# Patient Record
Sex: FEMALE | Race: WHITE | Employment: UNEMPLOYED | URBAN - NONMETROPOLITAN AREA
[De-identification: names, ages, dates, MRNs, and addresses within clinical notes are randomized per-mention and may not be internally consistent; named-entity substitution may affect disease eponyms.]

---

## 2020-05-11 LAB
ABO, EXTERNAL RESULT: NORMAL
C. TRACHOMATIS, EXTERNAL RESULT: NEGATIVE
HEP B, EXTERNAL RESULT: NEGATIVE
HIV, EXTERNAL RESULT: NORMAL
N. GONORRHOEAE, EXTERNAL RESULT: NEGATIVE
RH FACTOR, EXTERNAL RESULT: POSITIVE
RPR, EXTERNAL RESULT: NORMAL
RUBELLA TITER, EXTERNAL RESULT: NORMAL

## 2020-06-23 ENCOUNTER — HOSPITAL ENCOUNTER (OUTPATIENT)
Dept: ULTRASOUND IMAGING | Age: 25
Discharge: HOME | End: 2020-06-25
Payer: COMMERCIAL

## 2020-06-23 PROCEDURE — 76811 OB US DETAILED SNGL FETUS: CPT

## 2020-10-16 LAB — GBS, EXTERNAL RESULT: NEGATIVE

## 2020-10-23 ENCOUNTER — HOSPITAL ENCOUNTER (OUTPATIENT)
Age: 25
Discharge: HOME | End: 2020-10-23
Attending: OBSTETRICS & GYNECOLOGY | Admitting: OBSTETRICS & GYNECOLOGY
Payer: COMMERCIAL

## 2020-10-23 ENCOUNTER — HOSPITAL ENCOUNTER (OUTPATIENT)
Dept: ULTRASOUND IMAGING | Age: 25
Discharge: HOME | End: 2020-10-25
Payer: COMMERCIAL

## 2020-10-23 VITALS
SYSTOLIC BLOOD PRESSURE: 100 MMHG | HEART RATE: 78 BPM | DIASTOLIC BLOOD PRESSURE: 66 MMHG | TEMPERATURE: 98.3 F | RESPIRATION RATE: 16 BRPM

## 2020-10-23 PROCEDURE — 76819 FETAL BIOPHYS PROFIL W/O NST: CPT

## 2020-10-23 PROCEDURE — 59025 FETAL NON-STRESS TEST: CPT

## 2020-10-23 NOTE — FLOWSHEET NOTE
Patient off unit in stable condition.     Departure Mode: SELF    Mobility at Departure: ambulatory    Discharged to: private residence    Time of Discharge: [phone]

## 2020-10-23 NOTE — FLOWSHEET NOTE
D/C INSTRUCTIONS REVIEWED WITH PT.  PT ACKNOWLEDGES AND UNDERSTANDS. DENIES FURTHER NEEDS AT THIS TIME.

## 2020-10-23 NOTE — FLOWSHEET NOTE
Patient presents to L&D today for BORDERLINE OLIGOHYDRAMNIOS    IUP at 38 1/7 weeks     NST is reactive. Quality of tracing is satisfactory.

## 2020-10-24 ENCOUNTER — HOSPITAL ENCOUNTER (OUTPATIENT)
Age: 25
Discharge: HOME | End: 2020-10-24
Attending: OBSTETRICS & GYNECOLOGY | Admitting: OBSTETRICS & GYNECOLOGY
Payer: COMMERCIAL

## 2020-10-24 ENCOUNTER — APPOINTMENT (OUTPATIENT)
Dept: ULTRASOUND IMAGING | Age: 25
End: 2020-10-24
Payer: COMMERCIAL

## 2020-10-24 VITALS
SYSTOLIC BLOOD PRESSURE: 124 MMHG | HEART RATE: 101 BPM | RESPIRATION RATE: 18 BRPM | TEMPERATURE: 98.1 F | DIASTOLIC BLOOD PRESSURE: 84 MMHG

## 2020-10-24 PROCEDURE — 59025 FETAL NON-STRESS TEST: CPT

## 2020-10-24 PROCEDURE — 76818 FETAL BIOPHYS PROFILE W/NST: CPT

## 2020-10-24 NOTE — FLOWSHEET NOTE
Dr Jeff Norman called et notified of reactive NST and 8/8 on BPP. States pt can go home, pt to follow up on Monday in the office.

## 2020-10-27 ENCOUNTER — HOSPITAL ENCOUNTER (OUTPATIENT)
Age: 25
Discharge: HOME | End: 2020-10-27
Attending: OBSTETRICS & GYNECOLOGY | Admitting: OBSTETRICS & GYNECOLOGY
Payer: COMMERCIAL

## 2020-10-27 VITALS
HEIGHT: 63 IN | HEART RATE: 77 BPM | DIASTOLIC BLOOD PRESSURE: 70 MMHG | SYSTOLIC BLOOD PRESSURE: 116 MMHG | TEMPERATURE: 98 F | WEIGHT: 163 LBS | RESPIRATION RATE: 18 BRPM | BODY MASS INDEX: 28.88 KG/M2

## 2020-10-27 VITALS
HEART RATE: 83 BPM | SYSTOLIC BLOOD PRESSURE: 131 MMHG | RESPIRATION RATE: 18 BRPM | DIASTOLIC BLOOD PRESSURE: 74 MMHG | TEMPERATURE: 98.3 F

## 2020-10-27 LAB
-: NORMAL
AMORPHOUS: NORMAL
BACTERIA: NORMAL
BILIRUBIN URINE: NEGATIVE
CASTS UA: NORMAL /LPF
COLOR: YELLOW
COMMENT UA: ABNORMAL
CRYSTALS, UA: NORMAL /HPF
EPITHELIAL CELLS UA: NORMAL /HPF (ref 0–25)
GLUCOSE URINE: NEGATIVE
KETONES, URINE: NEGATIVE
LEUKOCYTE ESTERASE, URINE: NEGATIVE
MUCUS: NORMAL
NITRITE, URINE: NEGATIVE
OTHER OBSERVATIONS UA: NORMAL
PH UA: 8.5 (ref 5–9)
PROTEIN UA: NEGATIVE
RBC UA: NORMAL /HPF (ref 0–2)
RENAL EPITHELIAL, UA: NORMAL /HPF
SPECIFIC GRAVITY UA: 1.02 (ref 1.01–1.02)
TRICHOMONAS: NORMAL
TURBIDITY: CLEAR
URINE HGB: ABNORMAL
UROBILINOGEN, URINE: NORMAL
WBC UA: NORMAL /HPF (ref 0–5)
YEAST: NORMAL

## 2020-10-27 PROCEDURE — 99214 OFFICE O/P EST MOD 30 MIN: CPT

## 2020-10-27 PROCEDURE — 81001 URINALYSIS AUTO W/SCOPE: CPT

## 2020-10-27 PROCEDURE — 59025 FETAL NON-STRESS TEST: CPT

## 2020-10-27 RX ORDER — CALCIUM CARBONATE 200(500)MG
2 TABLET,CHEWABLE ORAL DAILY PRN
Status: ON HOLD | COMMUNITY
End: 2020-10-29 | Stop reason: HOSPADM

## 2020-10-27 RX ORDER — VITAMIN A, VITAMIN C, VITAMIN D-3, VITAMIN E, VITAMIN B-1, VITAMIN B-2, NIACIN, VITAMIN B-6, CALCIUM, IRON, ZINC, COPPER 4000; 120; 400; 22; 1.84; 3; 20; 10; 1; 12; 200; 27; 25; 2 [IU]/1; MG/1; [IU]/1; MG/1; MG/1; MG/1; MG/1; MG/1; MG/1; UG/1; MG/1; MG/1; MG/1; MG/1
2 TABLET ORAL DAILY
Status: ON HOLD | COMMUNITY
End: 2020-10-29 | Stop reason: HOSPADM

## 2020-10-27 NOTE — PROGRESS NOTES
Patient presents to L&D today for oligohydramnios. IUP at 38 5/7 weeks     NST is reactive. Quality of tracing is satisfactory.

## 2020-10-27 NOTE — PROGRESS NOTES
Pt here for scheduled nonstress test. Pt to come back on Friday for BPP with NST. Pt states she has \"borderline low amniotic fluid\". Pt states she has had a pinkish discharge today, saw Dr. Nuno Player yesterday and had SVE. Pt states she has been having contractions since 06:30 this morning but they started to spread further apart prior to arrival, states not close enough together to time once she thought about trying to time them. Pt reports + fetal movement today. Pt denies headache, blurred vision or floaters. Pt denies epigastric pain. Pt denies pain or burning when urinating.

## 2020-10-28 ENCOUNTER — ANESTHESIA (OUTPATIENT)
Dept: LABOR AND DELIVERY | Age: 25
End: 2020-10-28
Payer: COMMERCIAL

## 2020-10-28 ENCOUNTER — HOSPITAL ENCOUNTER (INPATIENT)
Age: 25
LOS: 1 days | Discharge: HOME | End: 2020-10-29
Attending: OBSTETRICS & GYNECOLOGY | Admitting: OBSTETRICS & GYNECOLOGY
Payer: COMMERCIAL

## 2020-10-28 ENCOUNTER — ANESTHESIA EVENT (OUTPATIENT)
Dept: LABOR AND DELIVERY | Age: 25
End: 2020-10-28
Payer: COMMERCIAL

## 2020-10-28 PROBLEM — Z34.90 TERM PREGNANCY: Status: ACTIVE | Noted: 2020-10-28

## 2020-10-28 LAB
ABSOLUTE EOS #: 0.2 K/UL (ref 0–0.44)
ABSOLUTE IMMATURE GRANULOCYTE: 0.07 K/UL (ref 0–0.3)
ABSOLUTE LYMPH #: 2.1 K/UL (ref 1.1–3.7)
ABSOLUTE MONO #: 0.84 K/UL (ref 0.1–1.2)
AMPHETAMINE SCREEN URINE: NEGATIVE
BARBITURATE SCREEN URINE: NEGATIVE
BASOPHILS # BLD: 0 % (ref 0–2)
BASOPHILS ABSOLUTE: 0.05 K/UL (ref 0–0.2)
BENZODIAZEPINE SCREEN, URINE: NEGATIVE
BUPRENORPHINE URINE: NEGATIVE
CANNABINOID SCREEN URINE: NEGATIVE
COCAINE METABOLITE, URINE: NEGATIVE
DIFFERENTIAL TYPE: ABNORMAL
EOSINOPHILS RELATIVE PERCENT: 1 % (ref 1–4)
HCT VFR BLD CALC: 35.5 % (ref 36.3–47.1)
HEMOGLOBIN: 11.1 G/DL (ref 11.9–15.1)
IMMATURE GRANULOCYTES: 0 %
LYMPHOCYTES # BLD: 13 % (ref 24–43)
MCH RBC QN AUTO: 27.9 PG (ref 25.2–33.5)
MCHC RBC AUTO-ENTMCNC: 31.3 G/DL (ref 28.4–34.8)
MCV RBC AUTO: 89.2 FL (ref 82.6–102.9)
MDMA URINE: NORMAL
METHADONE SCREEN, URINE: NEGATIVE
METHAMPHETAMINE, URINE: NEGATIVE
MONOCYTES # BLD: 5 % (ref 3–12)
NRBC AUTOMATED: 0 PER 100 WBC
OPIATES, URINE: NEGATIVE
OXYCODONE SCREEN URINE: NEGATIVE
PDW BLD-RTO: 13.2 % (ref 11.8–14.4)
PHENCYCLIDINE, URINE: NEGATIVE
PLATELET # BLD: 230 K/UL (ref 138–453)
PLATELET ESTIMATE: ABNORMAL
PMV BLD AUTO: 10 FL (ref 8.1–13.5)
PROPOXYPHENE, URINE: NEGATIVE
RBC # BLD: 3.98 M/UL (ref 3.95–5.11)
RBC # BLD: ABNORMAL 10*6/UL
SEG NEUTROPHILS: 81 % (ref 36–65)
SEGMENTED NEUTROPHILS ABSOLUTE COUNT: 12.63 K/UL (ref 1.5–8.1)
TEST INFORMATION: NORMAL
TRICYCLIC ANTIDEPRESSANTS, UR: NEGATIVE
WBC # BLD: 15.9 K/UL (ref 3.5–11.3)
WBC # BLD: ABNORMAL 10*3/UL

## 2020-10-28 PROCEDURE — 51701 INSERT BLADDER CATHETER: CPT

## 2020-10-28 PROCEDURE — 51702 INSERT TEMP BLADDER CATH: CPT

## 2020-10-28 PROCEDURE — 7200000001 HC VAGINAL DELIVERY

## 2020-10-28 PROCEDURE — 6360000002 HC RX W HCPCS: Performed by: OBSTETRICS & GYNECOLOGY

## 2020-10-28 PROCEDURE — 0DQP0ZZ REPAIR RECTUM, OPEN APPROACH: ICD-10-PCS | Performed by: OBSTETRICS & GYNECOLOGY

## 2020-10-28 PROCEDURE — 2580000003 HC RX 258: Performed by: OBSTETRICS & GYNECOLOGY

## 2020-10-28 PROCEDURE — 3E033VJ INTRODUCTION OF OTHER HORMONE INTO PERIPHERAL VEIN, PERCUTANEOUS APPROACH: ICD-10-PCS | Performed by: OBSTETRICS & GYNECOLOGY

## 2020-10-28 PROCEDURE — 88307 TISSUE EXAM BY PATHOLOGIST: CPT

## 2020-10-28 PROCEDURE — 3700000025 EPIDURAL BLOCK: Performed by: NURSE ANESTHETIST, CERTIFIED REGISTERED

## 2020-10-28 PROCEDURE — 2500000003 HC RX 250 WO HCPCS: Performed by: NURSE ANESTHETIST, CERTIFIED REGISTERED

## 2020-10-28 PROCEDURE — 85025 COMPLETE CBC W/AUTO DIFF WBC: CPT

## 2020-10-28 PROCEDURE — 1220000000 HC SEMI PRIVATE OB R&B

## 2020-10-28 PROCEDURE — 6360000002 HC RX W HCPCS: Performed by: NURSE ANESTHETIST, CERTIFIED REGISTERED

## 2020-10-28 PROCEDURE — 80306 DRUG TEST PRSMV INSTRMNT: CPT

## 2020-10-28 PROCEDURE — 6370000000 HC RX 637 (ALT 250 FOR IP): Performed by: OBSTETRICS & GYNECOLOGY

## 2020-10-28 RX ORDER — ROPIVACAINE HYDROCHLORIDE 2 MG/ML
INJECTION, SOLUTION EPIDURAL; INFILTRATION; PERINEURAL CONTINUOUS PRN
Status: DISCONTINUED | OUTPATIENT
Start: 2020-10-28 | End: 2020-10-28 | Stop reason: SDUPTHER

## 2020-10-28 RX ORDER — METHYLERGONOVINE MALEATE 0.2 MG/ML
200 INJECTION INTRAVENOUS PRN
Status: DISCONTINUED | OUTPATIENT
Start: 2020-10-28 | End: 2020-10-28

## 2020-10-28 RX ORDER — ACETAMINOPHEN 325 MG/1
650 TABLET ORAL EVERY 4 HOURS PRN
Status: DISCONTINUED | OUTPATIENT
Start: 2020-10-28 | End: 2020-10-29 | Stop reason: HOSPADM

## 2020-10-28 RX ORDER — SEVOFLURANE 250 ML/250ML
1 LIQUID RESPIRATORY (INHALATION) CONTINUOUS PRN
Status: DISCONTINUED | OUTPATIENT
Start: 2020-10-28 | End: 2020-10-28

## 2020-10-28 RX ORDER — MISOPROSTOL 100 UG/1
900 TABLET ORAL PRN
Status: DISCONTINUED | OUTPATIENT
Start: 2020-10-28 | End: 2020-10-28

## 2020-10-28 RX ORDER — CALCIUM CARBONATE 200(500)MG
2 TABLET,CHEWABLE ORAL DAILY PRN
Status: DISCONTINUED | OUTPATIENT
Start: 2020-10-28 | End: 2020-10-29 | Stop reason: HOSPADM

## 2020-10-28 RX ORDER — NALOXONE HYDROCHLORIDE 0.4 MG/ML
0.4 INJECTION, SOLUTION INTRAMUSCULAR; INTRAVENOUS; SUBCUTANEOUS PRN
Status: DISCONTINUED | OUTPATIENT
Start: 2020-10-28 | End: 2020-10-28

## 2020-10-28 RX ORDER — SODIUM CHLORIDE, SODIUM LACTATE, POTASSIUM CHLORIDE, CALCIUM CHLORIDE 600; 310; 30; 20 MG/100ML; MG/100ML; MG/100ML; MG/100ML
INJECTION, SOLUTION INTRAVENOUS PRN
Status: DISCONTINUED | OUTPATIENT
Start: 2020-10-28 | End: 2020-10-28

## 2020-10-28 RX ORDER — ONDANSETRON 2 MG/ML
4 INJECTION INTRAMUSCULAR; INTRAVENOUS EVERY 6 HOURS PRN
Status: DISCONTINUED | OUTPATIENT
Start: 2020-10-28 | End: 2020-10-29 | Stop reason: HOSPADM

## 2020-10-28 RX ORDER — SODIUM CHLORIDE 0.9 % (FLUSH) 0.9 %
10 SYRINGE (ML) INJECTION PRN
Status: DISCONTINUED | OUTPATIENT
Start: 2020-10-28 | End: 2020-10-28

## 2020-10-28 RX ORDER — ACETAMINOPHEN 325 MG/1
650 TABLET ORAL EVERY 4 HOURS PRN
Status: DISCONTINUED | OUTPATIENT
Start: 2020-10-28 | End: 2020-10-28

## 2020-10-28 RX ORDER — SODIUM CHLORIDE, SODIUM LACTATE, POTASSIUM CHLORIDE, CALCIUM CHLORIDE 600; 310; 30; 20 MG/100ML; MG/100ML; MG/100ML; MG/100ML
INJECTION, SOLUTION INTRAVENOUS CONTINUOUS
Status: DISCONTINUED | OUTPATIENT
Start: 2020-10-28 | End: 2020-10-29 | Stop reason: HOSPADM

## 2020-10-28 RX ORDER — IBUPROFEN 800 MG/1
800 TABLET ORAL EVERY 8 HOURS SCHEDULED
Status: DISCONTINUED | OUTPATIENT
Start: 2020-10-28 | End: 2020-10-29 | Stop reason: HOSPADM

## 2020-10-28 RX ORDER — PRENATAL WITH FERROUS FUM AND FOLIC ACID 3080; 920; 120; 400; 22; 1.84; 3; 20; 10; 1; 12; 200; 27; 25; 2 [IU]/1; [IU]/1; MG/1; [IU]/1; MG/1; MG/1; MG/1; MG/1; MG/1; MG/1; UG/1; MG/1; MG/1; MG/1; MG/1
1 TABLET ORAL DAILY
Status: DISCONTINUED | OUTPATIENT
Start: 2020-10-28 | End: 2020-10-29 | Stop reason: HOSPADM

## 2020-10-28 RX ORDER — DOCUSATE SODIUM 100 MG/1
100 CAPSULE, LIQUID FILLED ORAL 2 TIMES DAILY
Status: DISCONTINUED | OUTPATIENT
Start: 2020-10-28 | End: 2020-10-29 | Stop reason: HOSPADM

## 2020-10-28 RX ORDER — ONDANSETRON 2 MG/ML
4 INJECTION INTRAMUSCULAR; INTRAVENOUS EVERY 6 HOURS PRN
Status: DISCONTINUED | OUTPATIENT
Start: 2020-10-28 | End: 2020-10-28

## 2020-10-28 RX ORDER — EPHEDRINE SULFATE/0.9% NACL/PF 50 MG/5 ML
5 SYRINGE (ML) INTRAVENOUS EVERY 5 MIN PRN
Status: DISCONTINUED | OUTPATIENT
Start: 2020-10-28 | End: 2020-10-28

## 2020-10-28 RX ORDER — METHYLERGONOVINE MALEATE 0.2 MG/ML
200 INJECTION INTRAVENOUS PRN
Status: DISCONTINUED | OUTPATIENT
Start: 2020-10-28 | End: 2020-10-29 | Stop reason: HOSPADM

## 2020-10-28 RX ORDER — LIDOCAINE HYDROCHLORIDE 10 MG/ML
30 INJECTION, SOLUTION EPIDURAL; INFILTRATION; INTRACAUDAL; PERINEURAL PRN
Status: DISCONTINUED | OUTPATIENT
Start: 2020-10-28 | End: 2020-10-28

## 2020-10-28 RX ORDER — SODIUM CHLORIDE 0.9 % (FLUSH) 0.9 %
10 SYRINGE (ML) INJECTION EVERY 12 HOURS SCHEDULED
Status: DISCONTINUED | OUTPATIENT
Start: 2020-10-28 | End: 2020-10-28

## 2020-10-28 RX ORDER — LIDOCAINE HYDROCHLORIDE 20 MG/ML
INJECTION, SOLUTION EPIDURAL; INFILTRATION; INTRACAUDAL; PERINEURAL PRN
Status: DISCONTINUED | OUTPATIENT
Start: 2020-10-28 | End: 2020-10-28 | Stop reason: SDUPTHER

## 2020-10-28 RX ORDER — ROPIVACAINE HYDROCHLORIDE 2 MG/ML
10 INJECTION, SOLUTION EPIDURAL; INFILTRATION; PERINEURAL CONTINUOUS
Status: DISCONTINUED | OUTPATIENT
Start: 2020-10-28 | End: 2020-10-28

## 2020-10-28 RX ORDER — CARBOPROST TROMETHAMINE 250 UG/ML
250 INJECTION, SOLUTION INTRAMUSCULAR PRN
Status: DISCONTINUED | OUTPATIENT
Start: 2020-10-28 | End: 2020-10-28

## 2020-10-28 RX ORDER — FENTANYL CITRATE 50 UG/ML
25 INJECTION, SOLUTION INTRAMUSCULAR; INTRAVENOUS
Status: DISCONTINUED | OUTPATIENT
Start: 2020-10-28 | End: 2020-10-28

## 2020-10-28 RX ADMIN — DOCUSATE SODIUM 100 MG: 100 CAPSULE ORAL at 20:00

## 2020-10-28 RX ADMIN — IBUPROFEN 800 MG: 800 TABLET, FILM COATED ORAL at 14:33

## 2020-10-28 RX ADMIN — ROPIVACAINE HYDROCHLORIDE 10 ML/HR: 2 INJECTION, SOLUTION EPIDURAL; INFILTRATION at 07:33

## 2020-10-28 RX ADMIN — LIDOCAINE HYDROCHLORIDE 2 ML: 20 INJECTION, SOLUTION EPIDURAL; INFILTRATION; INTRACAUDAL; PERINEURAL at 07:26

## 2020-10-28 RX ADMIN — OXYTOCIN 1 MILLI-UNITS/MIN: 10 INJECTION INTRAVENOUS at 08:01

## 2020-10-28 RX ADMIN — OXYTOCIN 1 MILLI-UNITS/MIN: 10 INJECTION INTRAVENOUS at 10:33

## 2020-10-28 RX ADMIN — LIDOCAINE HYDROCHLORIDE 3 ML: 20 INJECTION, SOLUTION EPIDURAL; INFILTRATION; INTRACAUDAL; PERINEURAL at 07:24

## 2020-10-28 RX ADMIN — Medication 40 EACH: at 14:33

## 2020-10-28 RX ADMIN — DOCUSATE SODIUM 100 MG: 100 CAPSULE ORAL at 14:33

## 2020-10-28 RX ADMIN — SODIUM CHLORIDE, POTASSIUM CHLORIDE, SODIUM LACTATE AND CALCIUM CHLORIDE: 600; 310; 30; 20 INJECTION, SOLUTION INTRAVENOUS at 11:25

## 2020-10-28 RX ADMIN — BENZOCAINE AND LEVOMENTHOL: 200; 5 SPRAY TOPICAL at 14:34

## 2020-10-28 RX ADMIN — ACETAMINOPHEN 650 MG: 325 TABLET, FILM COATED ORAL at 20:00

## 2020-10-28 RX ADMIN — BUTORPHANOL TARTRATE 1 MG: 2 INJECTION, SOLUTION INTRAMUSCULAR; INTRAVENOUS at 04:55

## 2020-10-28 RX ADMIN — SODIUM CHLORIDE, POTASSIUM CHLORIDE, SODIUM LACTATE AND CALCIUM CHLORIDE: 600; 310; 30; 20 INJECTION, SOLUTION INTRAVENOUS at 07:26

## 2020-10-28 RX ADMIN — IBUPROFEN 800 MG: 800 TABLET, FILM COATED ORAL at 22:02

## 2020-10-28 RX ADMIN — Medication 166.7 ML: at 12:20

## 2020-10-28 RX ADMIN — SODIUM CHLORIDE, POTASSIUM CHLORIDE, SODIUM LACTATE AND CALCIUM CHLORIDE: 600; 310; 30; 20 INJECTION, SOLUTION INTRAVENOUS at 04:55

## 2020-10-28 ASSESSMENT — PAIN SCALES - GENERAL
PAINLEVEL_OUTOF10: 2
PAINLEVEL_OUTOF10: 5
PAINLEVEL_OUTOF10: 2
PAINLEVEL_OUTOF10: 10

## 2020-10-28 ASSESSMENT — PAIN DESCRIPTION - DESCRIPTORS
DESCRIPTORS: CRAMPING
DESCRIPTORS: DISCOMFORT

## 2020-10-28 NOTE — PROGRESS NOTES
After discussing plan of care with patient she states she would like to go to her mom's house to sleep (in [place]). States she will come back to unit if contractions intensify or get closer together. Dr. Randa Waldrop states to discharge her home and that she will follow-up in his office tomorrow morning (10/28).

## 2020-10-28 NOTE — ANESTHESIA PROCEDURE NOTES
Epidural Block    Patient location during procedure: OB  Start time: 10/28/2020 7:14 AM  End time: 10/28/2020 7:34 AM  Reason for block: labor epidural  Staffing  Resident/CRNA: ABRAHAM Giordano - CRNA  Preanesthetic Checklist  Completed: patient identified, site marked, surgical consent, pre-op evaluation, timeout performed, IV checked, risks and benefits discussed, monitors and equipment checked, anesthesia consent given, oxygen available and patient being monitored  Epidural  Patient position: sitting  Prep: ChloraPrep and site prepped and draped  Patient monitoring: continuous pulse ox and frequent blood pressure checks  Approach: midline  Location: lumbar (1-5)  Injection technique: FLORIDALMA air  Provider prep: sterile gloves and mask  Needle  Needle type: Tuohy   Needle gauge: 17 G  Needle length: 3.5 in  Needle insertion depth: 6 cm  Catheter type: side hole  Catheter size: 19 G  Catheter at skin depth: 11 cm  Test dose: negative (5 ml lidocaine 1.5% +epi 1:200K)  Kit: [employer] Perifix FX  Lot number: [id number]  Expiration date: 6/1/2021  Assessment  Sensory level: T8  Hemodynamics: stable  Attempts: 2  Additional Notes  0714 positoned for epidural, patient very uncomfortable with frequent ctx, unsuccessful attempt at L4-5, patient moving  0718 repositoned to modified yoga pose with legs crisscrossed, sterile field maintained but epidrual catheter fell to floor, new kit opened  0722 epidural placed easily x 1 attempt in new position  0723 test dose then taped in place and incrementally bolused  0727 to supine with right uterine displacement  0734 epidural infusion initiated and instructions given to patient regarding use of PCEA, patient expresses undrestanding.   Patient is comfortable and VSS

## 2020-10-28 NOTE — FLOWSHEET NOTE
Pt. Placed supine for cason placement, decel in to the 90s for 2 min noted, placed on left side after cason placed and decel resolved.

## 2020-10-28 NOTE — PROGRESS NOTES
Dr Jeff Norman updated via telephone on SVE, contractions, and pt undecided on epidural. No orders received at this time.

## 2020-10-28 NOTE — PROGRESS NOTES
Patient arrives to unit with complaint of contractions. Pt 38w5d. Up to bathroom to change in to gown and provide urine specimen.

## 2020-10-28 NOTE — PROGRESS NOTES
Patient presents to L&D today for rule out uterine contractions    IUP at 38w5d. NST is reactive. Quality of tracing is satisfactory.

## 2020-10-28 NOTE — L&D DELIVERY SUMMARY NOTE
Department of Obstetrics and Gynecology  Spontaneous Vaginal Delivery Note         Pre-operative Diagnosis:  Term pregnancy    Post-operative Diagnosis:  Same, delivered, Term single living child    Procedure:  Spontaneous vaginal delivery outlet vacuum forceps    Surgeon:  Nba Rowan M.D. Information for the patient's : Matteo Ellis [[id number]]          Anesthesia:  epidural anesthesia    Estimated blood loss:  300ml    Specimen:  Placenta was sent for pathological analysis. Umbilical cord blood sent Yes    Umbilical cord blood gases: not required    Complications:  4th degree perineal laceration    Condition:  infant stable to general nursery    Details of Procedure: The patient is a 26 y.o. female at 38w7d   OB History        2    Para        Term                AB   1    Living           SAB   1    TAB        Ectopic        Molar        Multiple        Live Births                 who was admitted for active phase labor. She received the following interventions: IV Pitocin induction The patient progressed well,did receive an epidural, became complete and started to push. After pushing for 1.5 hours the fetal head was at the perineum but due maternal exhaustion the pushing efforts were almost absent. A median episiotomy was performed. An outlet vacuum Kiwi forceps were gently placed and with contractions gentle traction was applied. After three attempts the head was delivered OP presentation. Nose and mouth were suctioned and the rest of the baby's body was delivered and placed on the mother's abdomen. Good crying effort was immediately obtained. The placenta was delivered spontaneously without any complications. A 4th degree perineal laceration was identified. The 4th degree perineal laceration was carefully as per the most recent edition of the [employer] book.  Chromic 3-0 was used for the rectal mucosa, chromic one for the sphincter anus and 2-0 chromic for the vaginal mucosa and skin. I was quite satisfied with the outcome of the repair. No other lacerations identified. Patient aware about the laceration and the need to use stool softeners for the next 6-8 weeks.

## 2020-10-28 NOTE — ANESTHESIA PRE PROCEDURE
Department of Anesthesiology  Preprocedure Note       Name:  Ling Monteiro   Age:  26 y. o.  :  1995                                          MRN:  [id number]         Date:  10/28/2020      Surgeon: * No surgeons listed *    Procedure: * No procedures listed *    Medications prior to admission:   Prior to Admission medications    Medication Sig Start Date End Date Taking?  Authorizing Provider   Prenatal Vit-Fe Fumarate-FA (PRENATAL VITAMIN PLUS LOW IRON) 27-1 MG TABS Take 2 tablets by mouth daily    Historical Provider, MD   calcium carbonate (TUMS) 500 MG chewable tablet Take 2 tablets by mouth daily as needed for Heartburn    Historical Provider, MD       Current medications:    Current Facility-Administered Medications   Medication Dose Route Frequency Provider Last Rate Last Dose    lactated ringers infusion   Intravenous PRN Brad Begum  mL/hr at 10/28/20 0648      sodium chloride flush 0.9 % injection 10 mL  10 mL Intravenous 2 times per day Brad Begum MD        sodium chloride flush 0.9 % injection 10 mL  10 mL Intravenous PRN Brad Begum MD        lidocaine PF 1 % injection 30 mL  30 mL Other PRN Brad Begum MD        nitrous oxide 50% inhalation 1 each  1 each Inhalation Continuous PRN Brad Begum MD        acetaminophen (TYLENOL) tablet 650 mg  650 mg Oral Q4H PRN Brad Begum MD        ondansetron (ZOFRAN) injection 4 mg  4 mg Intravenous Q6H PRN Brad Begum MD        oxytocin (PITOCIN) 10 unit bolus from the bag  10 Units Intravenous PRN Brad Begum MD        And    oxytocin (PITOCIN) 30 Units in sodium chloride 0.9 % 500 mL infusion  95 earle-units/min Intravenous PRN Brad Begum MD        methylergonovine (METHERGINE) injection 200 mcg  200 mcg Intramuscular PRN Brad Begum MD        carboprost (HEMABATE) injection 250 mcg  250 mcg Intramuscular PRN Brad Begum MD        miSOPROStol (CYTOTEC) tablet 900 mcg  900 mcg Rectal PRN Brad Begum MD  witch hazel-glycerin (TUCKS) pad   Topical PRN Janice Murdock MD        benzocaine-menthol (DERMOPLAST) 20-0.5 % spray   Topical PRN Janice Murdock MD        butorphanol (STADOL) injection 1 mg  1 mg Intravenous Q3H PRN Janice Murdock MD   1 mg at 10/28/20 0455       Allergies:     Allergies   Allergen Reactions    Rabbit Epithelium Hives       Problem List:    Patient Active Problem List   Diagnosis Code    Term pregnancy Z34.90       Past Medical History:        Diagnosis Date    Anemia     Seizures ([address]     febrile seizures when 2 yr old       Past Surgical History:        Procedure Laterality Date    TONSILLECTOMY         Social History:    Social History     Tobacco Use    Smoking status: Never Smoker    Smokeless tobacco: Never Used   Substance Use Topics    Alcohol use: Not Currently                                Counseling given: Not Answered      Vital Signs (Current):   Vitals:    10/28/20 0219 10/28/20 0455 10/28/20 0503 10/28/20 0614   BP: 122/76  119/80    Pulse: 71  80    Resp:   20 18   Temp:  36.4 °C (97.5 °F)     TempSrc:  Axillary                                                BP Readings from Last 3 Encounters:   10/28/20 119/80   10/27/20 131/74   10/27/20 116/70       NPO Status:                                                                                 BMI:   Wt Readings from Last 3 Encounters:   10/27/20 163 lb (73.9 kg)     There is no height or weight on file to calculate BMI.    CBC:   Lab Results   Component Value Date    WBC 15.9 10/28/2020    RBC 3.98 10/28/2020    RBC 4.45 05/21/2012    HGB 11.1 10/28/2020    HCT 35.5 10/28/2020    MCV 89.2 10/28/2020    RDW 13.2 10/28/2020     10/28/2020     05/21/2012       CMP:   Lab Results   Component Value Date     05/21/2012    K 3.9 05/21/2012     05/21/2012    CO2 30 05/21/2012    BUN 17 05/21/2012    CREATININE 0.83 05/21/2012    GFRAA NOT REPORTED 05/21/2012    LABGLOM  05/21/2012     Pediatric GFR requires additional information. Refer to [facility] website for    GLUCOSE 94 05/21/2012    CALCIUM 9.6 05/21/2012    BILITOT 0.30 05/21/2012    ALKPHOS 82 05/21/2012    AST 20 05/21/2012    ALT 14 05/21/2012       POC Tests: No results for input(s): POCGLU, POCNA, POCK, POCCL, POCBUN, POCHEMO, POCHCT in the last 72 hours. Coags: No results found for: PROTIME, INR, APTT    HCG (If Applicable): No results found for: PREGTESTUR, PREGSERUM, HCG, HCGQUANT     ABGs: No results found for: PHART, PO2ART, CNW7DHJ, PHR5XTI, BEART, J9TBEXPA     Type & Screen (If Applicable):  No results found for: LABABO, LABRH    Drug/Infectious Status (If Applicable):  No results found for: HIV, HEPCAB    COVID-19 Screening (If Applicable): No results found for: COVID19      Anesthesia Evaluation  Patient summary reviewed and Nursing notes reviewed no history of anesthetic complications:   Airway: Mallampati: I  TM distance: >3 FB     Mouth opening: > = 3 FB Dental: normal exam         Pulmonary:Negative Pulmonary ROS and normal exam                               Cardiovascular:Negative CV ROS                      Neuro/Psych:   Negative Neuro/Psych ROS              GI/Hepatic/Renal: Neg GI/Hepatic/Renal ROS            Endo/Other: Negative Endo/Other ROS                    Abdominal:           Vascular: negative vascular ROS. Anesthesia Plan      epidural     ASA 2             Anesthetic plan and risks discussed with patient.                     Barry Woodruff, ABRAHAM - CRNA   10/28/2020

## 2020-10-28 NOTE — FLOWSHEET NOTE
Called CRNA for patient request of epidural. CRNA is calling say shift CRNA to see if they can come to unit.  CRNA will call RN back

## 2020-10-28 NOTE — PROGRESS NOTES
Discharge instructions reviewed with patient and significant other. Denies any questions or concerns at this time.

## 2020-10-28 NOTE — H&P
member of club or organization: Not on file     Attends meetings of clubs or organizations: Not on file     Relationship status: Not on file    Intimate partner violence     Fear of current or ex partner: Not on file     Emotionally abused: Not on file     Physically abused: Not on file     Forced sexual activity: Not on file   Other Topics Concern    Not on file   Social History Narrative    Not on file     Family History:   History reviewed. No pertinent family history. Medications Prior to Admission:  Medications Prior to Admission: Prenatal Vit-Fe Fumarate-FA (PRENATAL VITAMIN PLUS LOW IRON) 27-1 MG TABS, Take 2 tablets by mouth daily  calcium carbonate (TUMS) 500 MG chewable tablet, Take 2 tablets by mouth daily as needed for Heartburn    REVIEW OF SYSTEMS:    CONSTITUTIONAL:  negative  RESPIRATORY:  negative  CARDIOVASCULAR:  negative  GASTROINTESTINAL:  negative  ALLERGIC/IMMUNOLOGIC:  negative  NEUROLOGICAL:  negative  BEHAVIOR/PSYCH:  negative    PHYSICAL EXAM:  Vitals:    10/28/20 0729 10/28/20 0730 10/28/20 0732 10/28/20 0734   BP: 113/75 119/79 113/73 118/72   Pulse: 86 104 105 90   Resp:  20     Temp:  98.4 °F (36.9 °C)     TempSrc:  Axillary       General appearance:  awake, alert, cooperative, no apparent distress, and appears stated age  HEENT: [address], trachea is midline, no thyroidmegaly  Heart: Regular rate and rhythm without murmur  Neurologic:  Awake, alert, oriented to name, place and time. Cranial nerves II-X11 grossly intact. Patellar DTR's 2/4 bilaterally. No clonus  Lungs:  No increased work of breathing, good air exchange  Abdomen:  Soft, non tender, gravid, consistent with her gestational age.  Fundal height 39 CM  Fetal heart rate:  Reassuring, Cat 1, appropriate for gestational age  Pelvis:  Adequate pelvis  Cervix: 8 cm 100% soft -1  Contraction frequency:  6 minutes    Membranes:  Ruptured clear fluid    Labs:   CBC:   Lab Results   Component Value Date    WBC 15.9 10/28/2020 RBC 3.98 10/28/2020    RBC 4.45 05/21/2012    HGB 11.1 10/28/2020    HCT 35.5 10/28/2020    MCV 89.2 10/28/2020    MCH 27.9 10/28/2020    MCHC 31.3 10/28/2020    RDW 13.2 10/28/2020     10/28/2020     05/21/2012    MPV 10.0 10/28/2020       Tracing is reassuring. ASSESSMENT: Term Intrauterine Pregnancy in active labor. PLAN: Antepartum labor protocol.        GBS: NEGATIVE

## 2020-10-28 NOTE — PROGRESS NOTES
Patient leaves unit ambulatory accompanied by significant other to private vehicle with discharge instructions in hand.

## 2020-10-28 NOTE — PROGRESS NOTES
Dr Dami Lino updated via telephone on pt complaints and SVE. Orders received to admit her with routine labor order set, consistent monitoring and she may have epidural if she desires.

## 2020-10-28 NOTE — PLAN OF CARE
Problem: Pain:  Description: Pain management should include both nonpharmacologic and pharmacologic interventions.   Goal: Pain level will decrease  Description: Pain level will decrease  10/28/2020 1241 by Jeani Runner, RN  Outcome: Completed  10/28/2020 0928 by Jeani Runner, RN  Outcome: Ongoing  Goal: Control of acute pain  Description: Control of acute pain  10/28/2020 1241 by Jeani Runner, RN  Outcome: Completed  10/28/2020 0928 by Jeani Runner, RN  Outcome: Ongoing  Goal: Control of chronic pain  Description: Control of chronic pain  10/28/2020 1241 by Jeani Runner, RN  Outcome: Completed  10/28/2020 0928 by Jeani Runner, RN  Outcome: Ongoing     Problem: Anxiety:  Goal: Level of anxiety will decrease  Description: Level of anxiety will decrease  10/28/2020 1241 by Jeani Runner, RN  Outcome: Completed  10/28/2020 0928 by Jeani Runner, RN  Outcome: Ongoing     Problem: Breathing Pattern - Ineffective:  Goal: Able to breathe comfortably  Description: Able to breathe comfortably  10/28/2020 1241 by Jeani Runner, RN  Outcome: Completed  10/28/2020 0928 by Jeani Runner, RN  Outcome: Ongoing     Problem: Fluid Volume - Imbalance:  Goal: Absence of imbalanced fluid volume signs and symptoms  Description: Absence of imbalanced fluid volume signs and symptoms  10/28/2020 1241 by Jeani Runner, RN  Outcome: Completed  10/28/2020 0928 by Jeani Runner, RN  Outcome: Ongoing  Goal: Absence of intrapartum hemorrhage signs and symptoms  Description: Absence of intrapartum hemorrhage signs and symptoms  10/28/2020 1241 by Jeani Runner, RN  Outcome: Completed  10/28/2020 0928 by Jeani Runner, RN  Outcome: Ongoing  Goal: Absence of postpartum hemorrhage signs and symptoms  Description: Absence of postpartum hemorrhage signs and symptoms  10/28/2020 1241 by Jeani Runner, RN  Outcome: Completed     Problem: Infection - Intrapartum Infection:  Goal: Will show no infection signs and symptoms  Description: Will show no infection signs and symptoms  10/28/2020 1241 by An Peña RN  Outcome: Completed  10/28/2020 0928 by An Peña RN  Outcome: Ongoing     Problem: Labor Process - Prolonged:  Goal: Labor progression, first stage, within specified pattern  Description: Labor progression, first stage, within specified pattern  10/28/2020 124 by An Peña RN  Outcome: Completed  10/28/2020 0928 by An Peña RN  Outcome: Ongoing  Goal: Labor progession, second stage, within specified pattern  Description: Labor progession, second stage, within specified pattern  10/28/2020 124 by An Peña RN  Outcome: Completed  10/28/2020 0928 by An Peña RN  Outcome: Ongoing  Goal: Uterine contractions within specified parameters  Description: Uterine contractions within specified parameters  10/28/2020 1241 by An Peña RN  Outcome: Completed  10/28/2020 0928 by An Peña RN  Outcome: Ongoing     Problem:  Screening:  Goal: Ability to make informed decisions regarding treatment has improved  Description: Ability to make informed decisions regarding treatment has improved  10/28/2020 124 by An Peña RN  Outcome: Completed  10/28/2020 0928 by An Peña RN  Outcome: Ongoing     Problem: Pain - Acute:  Goal: Pain level will decrease  Description: Pain level will decrease  10/28/2020 1241 by An Peña RN  Outcome: Completed  10/28/2020 0928 by An Peña RN  Outcome: Ongoing  Goal: Able to cope with pain  Description: Able to cope with pain  10/28/2020 1241 by An Peña RN  Outcome: Completed  10/28/2020 0928 by An Peña RN  Outcome: Ongoing     Problem: Tissue Perfusion - Uteroplacental, Altered:  Description: [TRUNCATED] For intrapartum patients with recurrent variable decelerations of the fetal heart rate, consider transcervical amnioinfusion. For patients in labor, avoid prophylactic use of continuous maternal oxygen supplementation to prevent nonreassu . ..   Goal: Absence of abnormal fetal heart rate pattern  Description: Absence of abnormal fetal heart rate pattern  10/28/2020 1241 by Silvia Shaffer RN  Outcome: Completed  10/28/2020 0928 by Silvia Shaffer RN  Outcome: Ongoing     Problem: Urinary Retention:  Goal: Experiences of bladder distention will decrease  Description: Experiences of bladder distention will decrease  10/28/2020 1241 by Silvia Shaffer RN  Outcome: Completed  10/28/2020 0928 by Silvia Shaffer RN  Outcome: Ongoing  Goal: Urinary elimination within specified parameters  Description: Urinary elimination within specified parameters  10/28/2020 1241 by Silvia Shaffer RN  Outcome: Completed  10/28/2020 0928 by Silvia Shaffer RN  Outcome: Ongoing     Problem: Constipation:  Goal: Bowel elimination is within specified parameters  Description: Bowel elimination is within specified parameters  10/28/2020 1241 by Silvia Shaffer RN  Outcome: Completed     Problem: Infection - Risk of, Puerperal Infection:  Goal: Will show no infection signs and symptoms  Description: Will show no infection signs and symptoms  10/28/2020 1241 by Silvia Shaffer RN  Outcome: Completed  10/28/2020 0928 by Silvia Shaffer RN  Outcome: Ongoing     Problem: Mood - Altered:  Goal: Mood stable  Description: Mood stable  10/28/2020 1241 by Silvia Shaffer RN  Outcome: Completed     Problem: Discharge Planning:  Goal: Discharged to appropriate level of care  Description: Discharged to appropriate level of care  Outcome: Ongoing     Problem: Constipation:  Goal: Bowel elimination is within specified parameters  Description: Bowel elimination is within specified parameters  Outcome: Ongoing

## 2020-10-29 VITALS
OXYGEN SATURATION: 98 % | DIASTOLIC BLOOD PRESSURE: 57 MMHG | TEMPERATURE: 98 F | HEART RATE: 88 BPM | RESPIRATION RATE: 16 BRPM | SYSTOLIC BLOOD PRESSURE: 112 MMHG

## 2020-10-29 PROCEDURE — 6370000000 HC RX 637 (ALT 250 FOR IP): Performed by: OBSTETRICS & GYNECOLOGY

## 2020-10-29 RX ADMIN — VITAMIN A, VITAMIN C, VITAMIN D-3, VITAMIN E, VITAMIN B-1, VITAMIN B-2, NIACIN, VITAMIN B-6, CALCIUM, IRON, ZINC, COPPER 1 TABLET: 4000; 120; 400; 22; 1.84; 3; 20; 10; 1; 12; 200; 27; 25; 2 TABLET ORAL at 08:53

## 2020-10-29 RX ADMIN — DOCUSATE SODIUM 100 MG: 100 CAPSULE ORAL at 08:54

## 2020-10-29 RX ADMIN — IBUPROFEN 800 MG: 800 TABLET, FILM COATED ORAL at 05:45

## 2020-10-29 ASSESSMENT — PAIN SCALES - GENERAL
PAINLEVEL_OUTOF10: 2
PAINLEVEL_OUTOF10: 3

## 2020-10-29 ASSESSMENT — PAIN DESCRIPTION - PAIN TYPE: TYPE: ACUTE PAIN

## 2020-10-29 ASSESSMENT — PAIN DESCRIPTION - PROGRESSION: CLINICAL_PROGRESSION: GRADUALLY WORSENING

## 2020-10-29 ASSESSMENT — PAIN DESCRIPTION - DESCRIPTORS: DESCRIPTORS: DISCOMFORT;ACHING

## 2020-10-29 ASSESSMENT — PAIN DESCRIPTION - FREQUENCY: FREQUENCY: INTERMITTENT

## 2020-10-29 ASSESSMENT — PAIN - FUNCTIONAL ASSESSMENT: PAIN_FUNCTIONAL_ASSESSMENT: ACTIVITIES ARE NOT PREVENTED

## 2020-10-29 ASSESSMENT — PAIN DESCRIPTION - LOCATION: LOCATION: PERINEUM

## 2020-10-29 NOTE — FLOWSHEET NOTE
Discharge instructions given and explained re: mom and baby care. She reads instructions and verbalizes understanding.

## 2020-10-29 NOTE — PROGRESS NOTES
Patient resting in bed at this time, pain is controlled and using ice packs for pain relief. Head to toe assessment done at this time.  Will continue to monitor

## 2020-10-29 NOTE — PLAN OF CARE
Problem: Discharge Planning:  Goal: Discharged to appropriate level of care  Description: Discharged to appropriate level of care  Outcome: Completed     Problem: Constipation:  Goal: Bowel elimination is within specified parameters  Description: Bowel elimination is within specified parameters  Outcome: Completed     Problem: Fluid Volume - Imbalance:  Goal: Absence of imbalanced fluid volume signs and symptoms  Description: Absence of imbalanced fluid volume signs and symptoms  Outcome: Completed  Goal: Absence of postpartum hemorrhage signs and symptoms  Description: Absence of postpartum hemorrhage signs and symptoms  Outcome: Completed     Problem: Infection - Risk of, Puerperal Infection:  Goal: Will show no infection signs and symptoms  Description: Will show no infection signs and symptoms  Outcome: Completed     Problem: Mood - Altered:  Goal: Mood stable  Description: Mood stable  Outcome: Completed     Problem: Pain - Acute:  Goal: Pain level will decrease  Description: Pain level will decrease  Outcome: Completed

## 2020-10-29 NOTE — FLOWSHEET NOTE
Patient off unit in stable condition. Departure Mode: with significant other.     Mobility at Departure: ambulatory    Discharged to: private residence    Time of Discharge: [phone]

## 2020-10-29 NOTE — PROGRESS NOTES
Dr. Marcella Vance / Jacqueline Villarreal Partum Note    Post-partum Day #1    Subjective:  Pain is : Mild  Lochia: thin lochia  Nausea: None  Bowel Movement/Flatus:  yes  Breastfeeding: plans to breastfeed    Objective:  Patient Vitals for the past 24 hrs:   BP Temp Temp src Pulse Resp SpO2   10/29/20 0740 (!) 98/55 97.7 °F (36.5 °C) Oral 74 16 --   10/29/20 0347 107/60 -- -- 76 -- --   10/29/20 0346 107/60 97.4 °F (36.3 °C) Oral 76 16 --   10/28/20 2319 112/65 98 °F (36.7 °C) Oral 77 16 --   10/28/20 1919 134/79 97.8 °F (36.6 °C) Oral 112 16 --   10/28/20 1614 117/73 98.7 °F (37.1 °C) Oral 100 -- --   10/28/20 1445 117/84 98.9 °F (37.2 °C) Oral 59 16 --   10/28/20 1429 112/73 -- -- 115 -- --   10/28/20 1415 112/70 -- -- 106 -- --   10/28/20 1400 108/65 -- -- 105 -- --   10/28/20 1345 105/68 -- -- 93 -- --   10/28/20 1330 107/66 -- -- 106 -- --   10/28/20 1315 108/69 -- -- 98 -- --   10/28/20 1259 130/77 -- -- 111 -- --   10/28/20 1244 126/73 -- -- 99 -- --   10/28/20 1230 123/66 -- -- 99 -- --   10/28/20 1208 -- -- -- -- -- 98 %   10/28/20 1203 -- -- -- -- -- 98 %   10/28/20 1200 119/78 -- -- 101 -- --   10/28/20 1158 -- -- -- -- -- 98 %   10/28/20 1153 -- -- -- -- -- 100 %   10/28/20 1148 -- -- -- -- -- 99 %   10/28/20 1145 111/73 -- -- 94 -- 100 %   10/28/20 1143 -- -- -- -- -- 100 %   10/28/20 1138 -- -- -- -- -- 96 %   10/28/20 1132 -- -- -- -- -- 96 %   10/28/20 1130 123/71 -- -- 108 -- --   10/28/20 1127 -- -- -- -- -- 95 %   10/28/20 1122 -- -- -- -- -- 95 %   10/28/20 1117 -- -- -- -- -- 95 %   10/28/20 1114 121/67 -- -- 100 -- --   10/28/20 1112 -- -- -- -- -- 97 %   10/28/20 1110 121/67 -- -- -- -- --   10/28/20 1107 -- -- -- -- -- 96 %   10/28/20 1102 -- -- -- -- -- 95 %   10/28/20 1100 123/70 -- -- 88 -- --   10/28/20 1057 -- -- -- -- -- 96 %   10/28/20 1052 -- -- -- -- -- 95 %   10/28/20 1050 -- 98.9 °F (37.2 °C) Oral -- -- --   10/28/20 1047 -- -- -- -- -- 96 %   10/28/20 1044 119/69 -- -- 92 -- --   10/28/20 1042 -- -- -- -- -- 96 %   10/28/20 1037 -- -- -- -- -- 96 %   10/28/20 1032 -- -- -- -- -- 95 %   10/28/20 1029 115/69 -- -- 106 -- --   10/28/20 1027 -- -- -- -- -- 95 %   10/28/20 1022 -- -- -- -- -- 97 %   10/28/20 1017 -- -- -- -- -- 98 %   10/28/20 1015 117/72 -- -- 89 -- --   10/28/20 1012 -- -- -- -- -- 99 %   10/28/20 1007 -- -- -- -- -- 96 %   10/28/20 1002 -- -- -- -- -- 96 %   10/28/20 1000 122/74 -- -- 95 -- --   10/28/20 0957 -- -- -- -- -- 95 %   10/28/20 0952 -- -- -- -- -- 95 %   10/28/20 0947 -- -- -- -- -- 95 %   10/28/20 0946 120/72 -- -- 100 -- --   10/28/20 0942 -- -- -- -- -- 95 %   10/28/20 0940 -- -- -- -- 20 --   10/28/20 0937 -- -- -- -- -- 96 %   10/28/20 0932 -- -- -- -- -- 95 %   10/28/20 0929 114/63 -- -- 121 -- --   10/28/20 0927 -- -- -- -- -- 95 %   10/28/20 0922 -- -- -- -- -- 96 %      Abdominal exam: soft, nontender, nondistended, no masses or organomegaly.      Wound: None    Perineum: clean, dry, intact and nontender    Lab Results   Component Value Date    WBC 15.9 (H) 10/28/2020    HGB 11.1 (L) 10/28/2020    HCT 35.5 (L) 10/28/2020    MCV 89.2 10/28/2020     10/28/2020          Current Facility-Administered Medications:     calcium carbonate (TUMS) chewable tablet 1,000 mg, 2 tablet, Oral, Daily PRN, Mara Blount MD    prenatal vitamin 27-1 MG tablet 1 tablet, 1 tablet, Oral, Daily, Mara Blount MD, 1 tablet at 10/29/20 6756    lactated ringers infusion, , Intravenous, Continuous, Mara Blount MD    acetaminophen (TYLENOL) tablet 650 mg, 650 mg, Oral, Q4H PRN, Mara Blount MD, 650 mg at 10/28/20 2000    ibuprofen (ADVIL;MOTRIN) tablet 800 mg, 800 mg, Oral, 3 times per day, Mara Blount MD, 800 mg at 10/29/20 0545    docusate sodium (COLACE) capsule 100 mg, 100 mg, Oral, BID, Mara Blount MD, 100 mg at 10/29/20 0854    ondansetron (ZOFRAN) injection 4 mg, 4 mg, Intravenous, Q6H PRN, Mara Blount MD    methylergonovine (METHERGINE) injection 200 mcg, 200 mcg, Intramuscular, PRN, Nba Rowan MD    witch hazel-glycerin (TUCKS) pad, , Topical, PRN, Nba Rowan MD, 40 each at 10/28/20 1433    benzocaine-menthol (DERMOPLAST) 20-0.5 % spray, , Topical, PRN, Nba Rowan MD     Assessment: Status post partum. Stable. Plan:  Continue same management.

## 2020-10-29 NOTE — ANESTHESIA POSTPROCEDURE EVALUATION
Department of Anesthesiology  Postprocedure Note    Patient: Sissy Amaya  MRN: [id number]  YOB: 1995  Date of evaluation: 10/29/2020  Time:  3:12 PM     Procedure Summary     Date:  10/28/20 Room / Location:      Anesthesia Start:  0714 Anesthesia Stop:  1214    Procedure:  Labor Analgesia Diagnosis:      Scheduled Providers:   Responsible Provider:  ABRAHAM Davidson CRNA    Anesthesia Type:  epidural ASA Status:  2          Anesthesia Type: epidural    Sofi Phase I:      Sofi Phase II:      Last vitals: Reviewed and per EMR flowsheets. Anesthesia Post Evaluation    Patient location during evaluation: bedside  Patient participation: complete - patient participated  Level of consciousness: awake and alert  Airway patency: patent  Nausea & Vomiting: no nausea and no vomiting  Complications: no  Cardiovascular status: hemodynamically stable  Respiratory status: acceptable and room air  Hydration status: stable  Comments: Patient very satisfied with epidural and anesthesia care. Denies residual motor or sensory deficits, no HA.

## 2020-10-30 LAB — SURGICAL PATHOLOGY REPORT: NORMAL

## 2021-08-26 ENCOUNTER — HOSPITAL ENCOUNTER (EMERGENCY)
Age: 26
Discharge: HOME | End: 2021-08-26
Payer: MEDICARE

## 2021-08-26 ENCOUNTER — APPOINTMENT (OUTPATIENT)
Dept: GENERAL RADIOLOGY | Age: 26
End: 2021-08-26
Payer: MEDICARE

## 2021-08-26 VITALS
RESPIRATION RATE: 18 BRPM | DIASTOLIC BLOOD PRESSURE: 79 MMHG | HEART RATE: 90 BPM | TEMPERATURE: 98.1 F | OXYGEN SATURATION: 98 % | SYSTOLIC BLOOD PRESSURE: 126 MMHG

## 2021-08-26 DIAGNOSIS — J06.9 VIRAL UPPER RESPIRATORY TRACT INFECTION: Primary | ICD-10-CM

## 2021-08-26 LAB
DIRECT EXAM: NORMAL
Lab: NORMAL
SPECIMEN DESCRIPTION: NORMAL

## 2021-08-26 PROCEDURE — 99283 EMERGENCY DEPT VISIT LOW MDM: CPT

## 2021-08-26 PROCEDURE — 87880 STREP A ASSAY W/OPTIC: CPT

## 2021-08-26 PROCEDURE — 71045 X-RAY EXAM CHEST 1 VIEW: CPT

## 2021-08-26 ASSESSMENT — ENCOUNTER SYMPTOMS
WHEEZING: 1
SORE THROAT: 1
RHINORRHEA: 1
COUGH: 1

## 2021-08-26 NOTE — ED PROVIDER NOTES
[facility] ED  eMERGENCY dEPARTMENT eNCOUnter      Pt Name: Cher Colbert  MRN: [id number]  Dontaegfjohn 1995  Date of evaluation: 8/26/21      CHIEF COMPLAINT       Chief Complaint   Patient presents with    Cough     Onset 2 days ago, non-productive. Pt reports shortness of breath last PM. Pt states she is pregnant, possible 3-4 months         HISTORY OF PRESENT ILLNESS    Cher Colbert is a 23 y.o. female who presents complaining of cough, no recent travel she is not on birth control. She has not had any recent surgeries she has never had a history of DVT or PE she does not have any hemoptysis. She has not had any prolonged immobility. She denies any calf pain or swelling. She states that she is possibly 3 months pregnant she is not having any vaginal discharge vaginal bleeding or abdominal pain. The history is provided by the patient. URI  Presenting symptoms: congestion, cough, rhinorrhea and sore throat    Presenting symptoms: no fever    Severity:  Mild  Onset quality:  Gradual  Duration:  2 days  Timing:  Intermittent  Chronicity:  New  Relieved by:  Nothing  Worsened by:  Nothing  Ineffective treatments:  None tried  Associated symptoms: wheezing    Associated symptoms: no myalgias and no neck pain        REVIEW OF SYSTEMS       Review of Systems   Constitutional: Negative for fever. HENT: Positive for congestion, rhinorrhea and sore throat. Respiratory: Positive for cough and wheezing. Musculoskeletal: Negative for myalgias and neck pain. All other systems reviewed and are negative. PAST MEDICAL HISTORY     Past Medical History:   Diagnosis Date    Anemia     Seizures ([address]     febrile seizures when 2 yr old       SURGICAL HISTORY       Past Surgical History:   Procedure Laterality Date    TONSILLECTOMY         CURRENT MEDICATIONS       There are no discharge medications for this patient. ALLERGIES     is allergic to rabbit epithelium.     FAMILY HISTORY has no family status information on file. SOCIAL HISTORY      reports that she has never smoked. She has never used smokeless tobacco. She reports previous alcohol use. She reports that she does not use drugs. PHYSICAL EXAM     INITIAL VITALS: /79   Pulse 90   Temp 98.1 °F (36.7 °C)   Resp 18   SpO2 98%      Physical Exam  Vitals and nursing note reviewed. Constitutional:       Appearance: She is well-developed. HENT:      Head: Normocephalic and atraumatic. Right Ear: Tympanic membrane normal.      Left Ear: Tympanic membrane normal.      Nose: Nose normal.      Mouth/Throat:      Mouth: Mucous membranes are moist.   Eyes:      Pupils: Pupils are equal, round, and reactive to light. Cardiovascular:      Rate and Rhythm: Normal rate and regular rhythm. Pulses: Normal pulses. Heart sounds: Normal heart sounds. No murmur heard. Pulmonary:      Effort: Pulmonary effort is normal.      Breath sounds: Normal breath sounds. Abdominal:      Tenderness: There is no abdominal tenderness. Musculoskeletal:         General: Normal range of motion. Cervical back: Normal range of motion. Lymphadenopathy:      Cervical: No cervical adenopathy. Skin:     General: Skin is warm and dry. Capillary Refill: Capillary refill takes less than 2 seconds. Neurological:      Mental Status: She is alert and oriented to person, place, and time. MEDICAL DECISION MAKING:       Tylenol or Motrin for pain or fever  Hot tea and Honey  Plenty of fluids  F/U with pcp in 2-3 days for recheck  To ED if worse  Chest x-ray shows no acute process. Patient states that she may be 3 to 4 months pregnant, this would not change the way she would be treated. Recommend that she follow-up with her OB/GYN which she hopes to do this week.   DIAGNOSTIC RESULTS     EKG: All EKG's are interpreted by the Emergency Department Physician who either signs or Co-signs this chart in the absence of acardiologist.        RADIOLOGY:Allplain film, CT, MRI, and formal ultrasound images (except ED bedside ultrasound) are read by the radiologist and the images and interpretations are directly viewed by the emergency physician. LABS:All lab results were reviewed by myself, and all abnormals are listed below. Labs Reviewed   STREP SCREEN GROUP A THROAT         EMERGENCY DEPARTMENT COURSE:   Vitals:    Vitals:    08/26/21 1634 08/26/21 1635 08/26/21 1808 08/26/21 1825   BP:  126/79     Pulse:  117 90    Resp:  22  18   Temp: 98.1 °F (36.7 °C)      SpO2:  97% 98%        The patient was given the following medications while in the emergency department:  No orders of the defined types were placed in this encounter. -------------------------      CRITICAL CARE:       CONSULTS:  None    PROCEDURES:  Procedures    FINAL IMPRESSION      1. Viral upper respiratory tract infection          DISPOSITION/PLAN   DISPOSITION        PATIENT REFERREDTO:  Simon Mcgarry MD  [place] [id number] [phone]    Schedule an appointment as soon as possible for a visit in 2 days  As needed    [facility] ED  [address] [facility]  [phone]    If symptoms worsen      DISCHARGEMEDICATIONS:  There are no discharge medications for this patient.       (Please note that portions of this note were completed with a voice recognition program.  Efforts were made to edit thedictations but occasionally words are mis-transcribed.)    BIJAN Kaufman PA-C  08/29/21 [id number]

## 2021-09-22 LAB
ABO, EXTERNAL RESULT: NORMAL
C. TRACHOMATIS, EXTERNAL RESULT: NEGATIVE
HEP B, EXTERNAL RESULT: NEGATIVE
HIV, EXTERNAL RESULT: NONREACTIVE
N. GONORRHOEAE, EXTERNAL RESULT: NEGATIVE
RH FACTOR, EXTERNAL RESULT: POSITIVE
RPR, EXTERNAL RESULT: NONREACTIVE
RUBELLA TITER, EXTERNAL RESULT: NORMAL

## 2021-09-24 ENCOUNTER — HOSPITAL ENCOUNTER (OUTPATIENT)
Dept: ULTRASOUND IMAGING | Age: 26
Discharge: HOME | End: 2021-09-26
Payer: MEDICARE

## 2021-09-24 DIAGNOSIS — Z33.1 IUP (INTRAUTERINE PREGNANCY), INCIDENTAL: ICD-10-CM

## 2021-09-24 PROCEDURE — 76805 OB US >/= 14 WKS SNGL FETUS: CPT

## 2022-01-05 LAB — GBS, EXTERNAL RESULT: NEGATIVE

## 2022-01-25 ENCOUNTER — APPOINTMENT (OUTPATIENT)
Dept: ULTRASOUND IMAGING | Age: 27
End: 2022-01-25
Payer: MEDICARE

## 2022-01-25 ENCOUNTER — HOSPITAL ENCOUNTER (OUTPATIENT)
Age: 27
Discharge: HOME | End: 2022-01-25
Attending: OBSTETRICS & GYNECOLOGY | Admitting: OBSTETRICS & GYNECOLOGY
Payer: MEDICARE

## 2022-01-25 VITALS — TEMPERATURE: 97.6 F | HEART RATE: 75 BPM | SYSTOLIC BLOOD PRESSURE: 124 MMHG | DIASTOLIC BLOOD PRESSURE: 71 MMHG

## 2022-01-25 PROCEDURE — 59025 FETAL NON-STRESS TEST: CPT

## 2022-01-25 PROCEDURE — 76818 FETAL BIOPHYS PROFILE W/NST: CPT

## 2022-01-25 NOTE — PROGRESS NOTES
Patient presents to L&D today for oligohydramnios    IUP at 38 weeks     NST is reactive. Quality of tracing is satisfactory.

## 2022-02-08 ENCOUNTER — HOSPITAL ENCOUNTER (INPATIENT)
Age: 27
LOS: 2 days | Discharge: HOME | DRG: 560 | End: 2022-02-10
Attending: OBSTETRICS & GYNECOLOGY | Admitting: OBSTETRICS & GYNECOLOGY
Payer: MEDICARE

## 2022-02-08 ENCOUNTER — ANESTHESIA EVENT (OUTPATIENT)
Dept: LABOR AND DELIVERY | Age: 27
DRG: 560 | End: 2022-02-08
Payer: MEDICARE

## 2022-02-08 ENCOUNTER — ANESTHESIA (OUTPATIENT)
Dept: LABOR AND DELIVERY | Age: 27
DRG: 560 | End: 2022-02-08
Payer: MEDICARE

## 2022-02-08 LAB
ABSOLUTE EOS #: 0.11 K/UL (ref 0–0.44)
ABSOLUTE IMMATURE GRANULOCYTE: 0.05 K/UL (ref 0–0.3)
ABSOLUTE LYMPH #: 1.9 K/UL (ref 1.1–3.7)
ABSOLUTE MONO #: 0.44 K/UL (ref 0.1–1.2)
AMPHETAMINE SCREEN URINE: NEGATIVE
BARBITURATE SCREEN URINE: NEGATIVE
BASOPHILS # BLD: 0 % (ref 0–2)
BASOPHILS ABSOLUTE: 0.03 K/UL (ref 0–0.2)
BENZODIAZEPINE SCREEN, URINE: NEGATIVE
BUPRENORPHINE URINE: NEGATIVE
CANNABINOID SCREEN URINE: NEGATIVE
COCAINE METABOLITE, URINE: NEGATIVE
DIFFERENTIAL TYPE: ABNORMAL
EOSINOPHILS RELATIVE PERCENT: 1 % (ref 1–4)
HCT VFR BLD CALC: 32.6 % (ref 36.3–47.1)
HEMOGLOBIN: 10.7 G/DL (ref 11.9–15.1)
IMMATURE GRANULOCYTES: 0 %
LYMPHOCYTES # BLD: 15 % (ref 24–43)
MCH RBC QN AUTO: 28.2 PG (ref 25.2–33.5)
MCHC RBC AUTO-ENTMCNC: 32.8 G/DL (ref 28.4–34.8)
MCV RBC AUTO: 86 FL (ref 82.6–102.9)
MDMA URINE: NORMAL
METHADONE SCREEN, URINE: NEGATIVE
METHAMPHETAMINE, URINE: NEGATIVE
MONOCYTES # BLD: 4 % (ref 3–12)
NRBC AUTOMATED: 0 PER 100 WBC
OPIATES, URINE: NEGATIVE
OXYCODONE SCREEN URINE: NEGATIVE
PDW BLD-RTO: 13.3 % (ref 11.8–14.4)
PHENCYCLIDINE, URINE: NEGATIVE
PLATELET # BLD: 228 K/UL (ref 138–453)
PLATELET ESTIMATE: ABNORMAL
PMV BLD AUTO: 9.7 FL (ref 8.1–13.5)
PROPOXYPHENE, URINE: NEGATIVE
RBC # BLD: 3.79 M/UL (ref 3.95–5.11)
RBC # BLD: ABNORMAL 10*6/UL
SEG NEUTROPHILS: 80 % (ref 36–65)
SEGMENTED NEUTROPHILS ABSOLUTE COUNT: 10.05 K/UL (ref 1.5–8.1)
TEST INFORMATION: NORMAL
TRICYCLIC ANTIDEPRESSANTS, UR: NEGATIVE
WBC # BLD: 12.6 K/UL (ref 3.5–11.3)
WBC # BLD: ABNORMAL 10*3/UL

## 2022-02-08 PROCEDURE — 0UQMXZZ REPAIR VULVA, EXTERNAL APPROACH: ICD-10-PCS | Performed by: OBSTETRICS & GYNECOLOGY

## 2022-02-08 PROCEDURE — 85025 COMPLETE CBC W/AUTO DIFF WBC: CPT

## 2022-02-08 PROCEDURE — 3700000025 EPIDURAL BLOCK: Performed by: NURSE ANESTHETIST, CERTIFIED REGISTERED

## 2022-02-08 PROCEDURE — 51702 INSERT TEMP BLADDER CATH: CPT

## 2022-02-08 PROCEDURE — 7200000001 HC VAGINAL DELIVERY

## 2022-02-08 PROCEDURE — 6370000000 HC RX 637 (ALT 250 FOR IP): Performed by: OBSTETRICS & GYNECOLOGY

## 2022-02-08 PROCEDURE — 1220000000 HC SEMI PRIVATE OB R&B

## 2022-02-08 PROCEDURE — 2580000003 HC RX 258: Performed by: OBSTETRICS & GYNECOLOGY

## 2022-02-08 PROCEDURE — 80306 DRUG TEST PRSMV INSTRMNT: CPT

## 2022-02-08 PROCEDURE — 6360000002 HC RX W HCPCS: Performed by: OBSTETRICS & GYNECOLOGY

## 2022-02-08 PROCEDURE — 36415 COLL VENOUS BLD VENIPUNCTURE: CPT

## 2022-02-08 PROCEDURE — 88307 TISSUE EXAM BY PATHOLOGIST: CPT

## 2022-02-08 PROCEDURE — 6360000002 HC RX W HCPCS: Performed by: NURSE ANESTHETIST, CERTIFIED REGISTERED

## 2022-02-08 RX ORDER — CARBOPROST TROMETHAMINE 250 UG/ML
250 INJECTION, SOLUTION INTRAMUSCULAR PRN
Status: DISCONTINUED | OUTPATIENT
Start: 2022-02-08 | End: 2022-02-10 | Stop reason: HOSPADM

## 2022-02-08 RX ORDER — ROPIVACAINE HYDROCHLORIDE 2 MG/ML
INJECTION, SOLUTION EPIDURAL; INFILTRATION; PERINEURAL CONTINUOUS PRN
Status: DISCONTINUED | OUTPATIENT
Start: 2022-02-08 | End: 2022-02-08 | Stop reason: SDUPTHER

## 2022-02-08 RX ORDER — SODIUM CHLORIDE, SODIUM LACTATE, POTASSIUM CHLORIDE, CALCIUM CHLORIDE 600; 310; 30; 20 MG/100ML; MG/100ML; MG/100ML; MG/100ML
INJECTION, SOLUTION INTRAVENOUS CONTINUOUS
Status: DISCONTINUED | OUTPATIENT
Start: 2022-02-08 | End: 2022-02-09 | Stop reason: ALTCHOICE

## 2022-02-08 RX ORDER — SODIUM CHLORIDE 9 MG/ML
25 INJECTION, SOLUTION INTRAVENOUS PRN
Status: DISCONTINUED | OUTPATIENT
Start: 2022-02-08 | End: 2022-02-10 | Stop reason: HOSPADM

## 2022-02-08 RX ORDER — NALBUPHINE HCL 10 MG/ML
10 AMPUL (ML) INJECTION
Status: DISCONTINUED | OUTPATIENT
Start: 2022-02-08 | End: 2022-02-10 | Stop reason: HOSPADM

## 2022-02-08 RX ORDER — IBUPROFEN 800 MG/1
800 TABLET ORAL EVERY 8 HOURS SCHEDULED
Status: DISCONTINUED | OUTPATIENT
Start: 2022-02-08 | End: 2022-02-10 | Stop reason: HOSPADM

## 2022-02-08 RX ORDER — LIDOCAINE HYDROCHLORIDE 10 MG/ML
30 INJECTION, SOLUTION EPIDURAL; INFILTRATION; INTRACAUDAL; PERINEURAL PRN
Status: DISCONTINUED | OUTPATIENT
Start: 2022-02-08 | End: 2022-02-10 | Stop reason: HOSPADM

## 2022-02-08 RX ORDER — NALOXONE HYDROCHLORIDE 0.4 MG/ML
0.4 INJECTION, SOLUTION INTRAMUSCULAR; INTRAVENOUS; SUBCUTANEOUS PRN
Status: DISCONTINUED | OUTPATIENT
Start: 2022-02-08 | End: 2022-02-10 | Stop reason: HOSPADM

## 2022-02-08 RX ORDER — ACETAMINOPHEN 500 MG
500 TABLET ORAL EVERY 6 HOURS PRN
COMMUNITY

## 2022-02-08 RX ORDER — SODIUM CHLORIDE, SODIUM LACTATE, POTASSIUM CHLORIDE, CALCIUM CHLORIDE 600; 310; 30; 20 MG/100ML; MG/100ML; MG/100ML; MG/100ML
500 INJECTION, SOLUTION INTRAVENOUS PRN
Status: DISCONTINUED | OUTPATIENT
Start: 2022-02-08 | End: 2022-02-10 | Stop reason: HOSPADM

## 2022-02-08 RX ORDER — SODIUM CHLORIDE, SODIUM LACTATE, POTASSIUM CHLORIDE, CALCIUM CHLORIDE 600; 310; 30; 20 MG/100ML; MG/100ML; MG/100ML; MG/100ML
1000 INJECTION, SOLUTION INTRAVENOUS PRN
Status: DISCONTINUED | OUTPATIENT
Start: 2022-02-08 | End: 2022-02-10 | Stop reason: HOSPADM

## 2022-02-08 RX ORDER — ACETAMINOPHEN 325 MG/1
650 TABLET ORAL EVERY 4 HOURS PRN
Status: DISCONTINUED | OUTPATIENT
Start: 2022-02-08 | End: 2022-02-10 | Stop reason: HOSPADM

## 2022-02-08 RX ORDER — EPHEDRINE SULFATE/0.9% NACL/PF 50 MG/5 ML
5 SYRINGE (ML) INTRAVENOUS EVERY 5 MIN PRN
Status: DISCONTINUED | OUTPATIENT
Start: 2022-02-08 | End: 2022-02-10 | Stop reason: HOSPADM

## 2022-02-08 RX ORDER — SODIUM CHLORIDE 0.9 % (FLUSH) 0.9 %
5-40 SYRINGE (ML) INJECTION EVERY 12 HOURS SCHEDULED
Status: DISCONTINUED | OUTPATIENT
Start: 2022-02-08 | End: 2022-02-10 | Stop reason: HOSPADM

## 2022-02-08 RX ORDER — DOCUSATE SODIUM 100 MG/1
100 CAPSULE, LIQUID FILLED ORAL 2 TIMES DAILY
Status: DISCONTINUED | OUTPATIENT
Start: 2022-02-08 | End: 2022-02-10 | Stop reason: HOSPADM

## 2022-02-08 RX ORDER — METHYLERGONOVINE MALEATE 0.2 MG/ML
200 INJECTION INTRAVENOUS PRN
Status: DISCONTINUED | OUTPATIENT
Start: 2022-02-08 | End: 2022-02-10 | Stop reason: HOSPADM

## 2022-02-08 RX ORDER — SODIUM CHLORIDE 0.9 % (FLUSH) 0.9 %
5-40 SYRINGE (ML) INJECTION PRN
Status: DISCONTINUED | OUTPATIENT
Start: 2022-02-08 | End: 2022-02-10 | Stop reason: HOSPADM

## 2022-02-08 RX ORDER — SEVOFLURANE 250 ML/250ML
1 LIQUID RESPIRATORY (INHALATION) CONTINUOUS PRN
Status: DISCONTINUED | OUTPATIENT
Start: 2022-02-08 | End: 2022-02-10 | Stop reason: HOSPADM

## 2022-02-08 RX ORDER — MISOPROSTOL 100 UG/1
900 TABLET ORAL PRN
Status: DISCONTINUED | OUTPATIENT
Start: 2022-02-08 | End: 2022-02-10 | Stop reason: HOSPADM

## 2022-02-08 RX ORDER — ACETAMINOPHEN 325 MG/1
650 TABLET ORAL EVERY 4 HOURS PRN
Status: DISCONTINUED | OUTPATIENT
Start: 2022-02-08 | End: 2022-02-08

## 2022-02-08 RX ORDER — ONDANSETRON 2 MG/ML
4 INJECTION INTRAMUSCULAR; INTRAVENOUS EVERY 6 HOURS PRN
Status: DISCONTINUED | OUTPATIENT
Start: 2022-02-08 | End: 2022-02-10 | Stop reason: HOSPADM

## 2022-02-08 RX ORDER — ROPIVACAINE HYDROCHLORIDE 2 MG/ML
9 INJECTION, SOLUTION EPIDURAL; INFILTRATION; PERINEURAL CONTINUOUS
Status: DISCONTINUED | OUTPATIENT
Start: 2022-02-08 | End: 2022-02-09 | Stop reason: ALTCHOICE

## 2022-02-08 RX ADMIN — SODIUM CHLORIDE, POTASSIUM CHLORIDE, SODIUM LACTATE AND CALCIUM CHLORIDE 1000 ML: 600; 310; 30; 20 INJECTION, SOLUTION INTRAVENOUS at 15:17

## 2022-02-08 RX ADMIN — SODIUM CHLORIDE, POTASSIUM CHLORIDE, SODIUM LACTATE AND CALCIUM CHLORIDE: 600; 310; 30; 20 INJECTION, SOLUTION INTRAVENOUS at 16:27

## 2022-02-08 RX ADMIN — ROPIVACAINE HYDROCHLORIDE 9 ML/HR: 2 INJECTION, SOLUTION EPIDURAL; INFILTRATION at 16:56

## 2022-02-08 RX ADMIN — BENZOCAINE AND LEVOMENTHOL: 200; 5 SPRAY TOPICAL at 23:34

## 2022-02-08 RX ADMIN — Medication 40 EACH: at 23:35

## 2022-02-08 RX ADMIN — SODIUM CHLORIDE, POTASSIUM CHLORIDE, SODIUM LACTATE AND CALCIUM CHLORIDE: 600; 310; 30; 20 INJECTION, SOLUTION INTRAVENOUS at 19:17

## 2022-02-08 RX ADMIN — IBUPROFEN 800 MG: 800 TABLET, FILM COATED ORAL at 23:34

## 2022-02-08 RX ADMIN — Medication 1 MILLI-UNITS/MIN: at 17:55

## 2022-02-08 ASSESSMENT — PAIN SCALES - GENERAL: PAINLEVEL_OUTOF10: 3

## 2022-02-08 NOTE — FLOWSHEET NOTE
Plan of care discussed with dr. Harriett Chandra. States that he would like pt to get her epidural at this time. States he is on his way to hospital and will be approx. 30 min to [facility].

## 2022-02-08 NOTE — H&P
Department of Obstetrics and Gynecology   Obstetrics History and Physical        CHIEF COMPLAINT:  Active labor. HISTORY OF PRESENT ILLNESS:      The patient is a 33 y.o. female at 39w6d. OB History        3    Para   1    Term   1            AB   1    Living   1       SAB   1    IAB        Ectopic        Molar        Multiple   0    Live Births   1            Patient presents in active labor. Estimated Due Date: Estimated Date of Delivery: 2/3/22    PRENATAL CARE:    Complicated by: none    PAST OB HISTORY:  OB History        3    Para   1    Term   1            AB   1    Living   1       SAB   1    IAB        Ectopic        Molar        Multiple   0    Live Births   1                Past Medical History:        Diagnosis Date    Anemia     Seizures ([address]     febrile seizures when 2 yr old     Past Surgical History:        Procedure Laterality Date    TONSILLECTOMY       Allergies:  Rabbit epithelium and Shrimp (diagnostic)    Social History:    Social History     Socioeconomic History    Marital status: Single     Spouse name: Not on file    Number of children: Not on file    Years of education: Not on file    Highest education level: Not on file   Occupational History    Not on file   Tobacco Use    Smoking status: Never Smoker    Smokeless tobacco: Never Used   Vaping Use    Vaping Use: Never used   Substance and Sexual Activity    Alcohol use: Not Currently    Drug use: Never    Sexual activity: Yes     Partners: Male   Other Topics Concern    Not on file   Social History Narrative    Not on file     Social Determinants of Health     Financial Resource Strain:     Difficulty of Paying Living Expenses: Not on file   Food Insecurity:     Worried About Running Out of Food in the Last Year: Not on file    Brianna of Food in the Last Year: Not on file   Transportation Needs:     Lack of Transportation (Medical):  Not on file    Lack of Transportation (Non-Medical): Not on file   Physical Activity:     Days of Exercise per Week: Not on file    Minutes of Exercise per Session: Not on file   Stress:     Feeling of Stress : Not on file   Social Connections:     Frequency of Communication with Friends and Family: Not on file    Frequency of Social Gatherings with Friends and Family: Not on file    Attends [employer] Services: Not on file    Active Member of [address] [place] [address] or Organizations: Not on file    Attends Club or Organization Meetings: Not on file    Marital Status: Not on file   Intimate Partner Violence:     Fear of Current or Ex-Partner: Not on file    Emotionally Abused: Not on file    Physically Abused: Not on file    Sexually Abused: Not on file   Housing Stability:     Unable to Pay for Housing in the Last Year: Not on file    Number of [place] in the Last Year: Not on file    Unstable Housing in the Last Year: Not on file     Family History:       Problem Relation Age of Onset    Diabetes Mother      Medications Prior to Admission:  Medications Prior to Admission: Prenatal MV-Min-Fe Fum-FA-DHA (PRENATAL 1 PO), Take by mouth  acetaminophen (TYLENOL) 500 MG tablet, Take 500 mg by mouth every 6 hours as needed for Pain    REVIEW OF SYSTEMS:    CONSTITUTIONAL:  negative  RESPIRATORY:  negative  CARDIOVASCULAR:  negative  GASTROINTESTINAL:  negative  ALLERGIC/IMMUNOLOGIC:  negative  NEUROLOGICAL:  negative  BEHAVIOR/PSYCH:  negative    PHYSICAL EXAM:  Vitals:    02/08/22 1719 02/08/22 1724 02/08/22 1729 02/08/22 1734   BP:       Pulse:       Resp:       Temp:       TempSrc:       SpO2: 98% 99% 99% 100%   Weight:       Height:         General appearance:  awake, alert, cooperative, no apparent distress, and appears stated age  HEENT: KARRIE EOMI, trachea is midline, no thyroidmegaly  Heart: Regular rate and rhythm without murmur  Neurologic:  Awake, alert, oriented to name, place and time. Cranial nerves II-X11 grossly intact.   Patellar DTR's 2/4 bilaterally. No clonus  Lungs:  No increased work of breathing, good air exchange  Abdomen:  Soft, non tender, gravid, consistent with her gestational age. Fundal height 38 CM  Fetal heart rate:  Reassuring, Cat 1, appropriate for gestational age  Pelvis:  Adequate pelvis  Cervix: 4 cm 75% soft -1  Contraction frequency:  6 minutes    Membranes:  Ruptured clear fluid    Labs:   CBC:   Lab Results   Component Value Date    WBC 12.6 02/08/2022    RBC 3.79 02/08/2022    RBC 4.45 05/21/2012    HGB 10.7 02/08/2022    HCT 32.6 02/08/2022    MCV 86.0 02/08/2022    MCH 28.2 02/08/2022    MCHC 32.8 02/08/2022    RDW 13.3 02/08/2022     02/08/2022     05/21/2012    MPV 9.7 02/08/2022       Tracing is reassuring. ASSESSMENT: Term Intrauterine Pregnancy in active labor. PLAN: Antepartum labor protocol.        GBS: negative

## 2022-02-08 NOTE — ANESTHESIA PROCEDURE NOTES
Epidural Block    Patient location during procedure: OB  Start time: 2/8/2022 4:47 PM  End time: 2/8/2022 4:56 PM  Reason for block: labor epidural  Staffing  Resident/CRNA: Kurtis Newman. Anastasiya Curiel, APRN - CRNA  Preanesthetic Checklist  Completed: patient identified, IV checked, site marked, risks and benefits discussed, monitors and equipment checked, pre-op evaluation, timeout performed, anesthesia consent given, oxygen available and patient being monitored  Epidural  Patient position: sitting  Prep: ChloraPrep  Patient monitoring: continuous pulse ox and frequent blood pressure checks  Approach: midline  Location: lumbar (1-5) (L4-5)  Injection technique: FLORIDALMA saline  Provider prep: mask and sterile gloves  Needle  Needle type: Tuohy   Needle gauge: 17 G  Needle length: 3.5 in  Needle insertion depth: 6 cm  Catheter type: side hole  Catheter size: 19 G  Catheter at skin depth: 12 cm  Test dose: negative  Assessment  Sensory level: T6  Events: paresthesia  Hemodynamics: stable  Attempts: 1  Additional Notes  Pt with right sided parasthesia that resolved.  Stated that she had a right sided block on her first epidural.

## 2022-02-08 NOTE — ANESTHESIA PRE PROCEDURE
Department of Anesthesiology  Preprocedure Note       Name:  Amado Credit   Age:  33 y.o.  :  1995                                          MRN:  [id number]         Date:  2022      Surgeon: * No surgeons listed *    Procedure: * No procedures listed *    Medications prior to admission:   Prior to Admission medications    Medication Sig Start Date End Date Taking?  Authorizing Provider   Prenatal MV-Min-Fe Fum-FA-DHA (PRENATAL 1 PO) Take by mouth   Yes Historical Provider, MD   acetaminophen (TYLENOL) 500 MG tablet Take 500 mg by mouth every 6 hours as needed for Pain   Yes Historical Provider, MD       Current medications:    Current Facility-Administered Medications   Medication Dose Route Frequency Provider Last Rate Last Admin    lactated ringers infusion   IntraVENous Continuous Richar Vines  mL/hr at 22 1627 New Bag at 22 1627    lactated ringers infusion 500 mL  500 mL IntraVENous PRN Richar Vines MD        Or    lactated ringers infusion 1,000 mL  1,000 mL IntraVENous PRN Richar Vines  mL/hr at 22 1517 1,000 mL at 22 1517    sodium chloride flush 0.9 % injection 5-40 mL  5-40 mL IntraVENous 2 times per day Richar Vines MD        sodium chloride flush 0.9 % injection 5-40 mL  5-40 mL IntraVENous PRN Richar Vines MD        0.9 % sodium chloride infusion  25 mL IntraVENous PRN Richar Vines MD        lidocaine PF 1 % injection 30 mL  30 mL Other PRN Richar Vines MD        nalbuphine (NUBAIN) injection 10 mg  10 mg IntraVENous Q2H PRN Richar Vines MD        nitrous oxide 50% inhalation 1 each  1 each Inhalation Continuous PRN Richar Vines MD        ondansetron (ZOFRAN) injection 4 mg  4 mg IntraVENous Q6H PRN Richar Vines MD        oxytocin (PITOCIN) 30 units in 500 mL infusion  166 earle-units/min IntraVENous PRN Richar Vines MD        And    oxytocin (PITOCIN) 10 unit bolus from the bag  999 mL/hr IntraVENous PRN Richar Vines MD  methylergonovine (METHERGINE) injection 200 mcg  200 mcg IntraMUSCular PRN Johny Acevedo MD        carboprost (HEMABATE) injection 250 mcg  250 mcg IntraMUSCular PRN Johny Acevedo MD        miSOPROStol (CYTOTEC) tablet 900 mcg  900 mcg Rectal PRN Johny Acevedo MD        acetaminophen (TYLENOL) tablet 650 mg  650 mg Oral Q4H PRN Johny Acevedo MD       [facility] witch hazel-glycerin (TUCKS) pad   Topical PRN Johny Acevedo MD        benzocaine-menthol (DERMOPLAST) 20-0.5 % spray   Topical PRN Johny Acevedo MD           Allergies: Allergies   Allergen Reactions    Rabbit Epithelium Hives    Shrimp (Diagnostic)        Problem List:    Patient Active Problem List   Diagnosis Code    Term pregnancy Z34.90       Past Medical History:        Diagnosis Date    Anemia     Seizures ([address]     febrile seizures when 2 yr old       Past Surgical History:        Procedure Laterality Date    TONSILLECTOMY         Social History:    Social History     Tobacco Use    Smoking status: Never Smoker    Smokeless tobacco: Never Used   Substance Use Topics    Alcohol use: Not Currently                                Counseling given: Not Answered      Vital Signs (Current):   Vitals:    02/08/22 1437 02/08/22 1627   BP: 124/71 120/67   Pulse: 101 83   Resp: 18    Temp: 37.1 °C (98.8 °F)    TempSrc: Oral    Weight: 155 lb (70.3 kg)    Height: 5' 3\" (1.6 m)                                               BP Readings from Last 3 Encounters:   02/08/22 120/67   01/25/22 124/71   08/26/21 126/79       NPO Status:                                                                                 BMI:   Wt Readings from Last 3 Encounters:   02/08/22 155 lb (70.3 kg)   10/27/20 163 lb (73.9 kg)     Body mass index is 27.46 kg/m².     CBC:   Lab Results   Component Value Date    WBC 12.6 02/08/2022    RBC 3.79 02/08/2022    RBC 4.45 05/21/2012    HGB 10.7 02/08/2022    HCT 32.6 02/08/2022    MCV 86.0 02/08/2022    RDW 13.3 02/08/2022  02/08/2022     05/21/2012       CMP:   Lab Results   Component Value Date     05/21/2012    K 3.9 05/21/2012     05/21/2012    CO2 30 05/21/2012    BUN 17 05/21/2012    CREATININE 0.83 05/21/2012    GFRAA NOT REPORTED 05/21/2012    LABGLOM  05/21/2012     Pediatric GFR requires additional information. Refer to [facility] website for    GLUCOSE 94 05/21/2012    CALCIUM 9.6 05/21/2012    BILITOT 0.30 05/21/2012    ALKPHOS 82 05/21/2012    AST 20 05/21/2012    ALT 14 05/21/2012       POC Tests: No results for input(s): POCGLU, POCNA, POCK, POCCL, POCBUN, POCHEMO, POCHCT in the last 72 hours. Coags: No results found for: PROTIME, INR, APTT    HCG (If Applicable): No results found for: PREGTESTUR, PREGSERUM, HCG, HCGQUANT     ABGs: No results found for: PHART, PO2ART, HXH2TIO, DGF5JSR, BEART, M2WTAUXG     Type & Screen (If Applicable):  No results found for: LABABO, LABRH    Drug/Infectious Status (If Applicable):  No results found for: HIV, HEPCAB    COVID-19 Screening (If Applicable): No results found for: COVID19        Anesthesia Evaluation  Patient summary reviewed and Nursing notes reviewed  Airway: Mallampati: II  TM distance: >3 FB   Neck ROM: full  Mouth opening: > = 3 FB Dental: normal exam         Pulmonary:Negative Pulmonary ROS and normal exam                               Cardiovascular:Negative CV ROS                      Neuro/Psych:   Negative Neuro/Psych ROS              GI/Hepatic/Renal:   (+) GERD: well controlled,           Endo/Other: Negative Endo/Other ROS                    Abdominal:             Vascular: negative vascular ROS. Other Findings:             Anesthesia Plan      epidural     ASA 1             Anesthetic plan and risks discussed with patient.                       Mackey Runner, APRN - CRNA   2/8/2022

## 2022-02-09 PROCEDURE — 1220000000 HC SEMI PRIVATE OB R&B

## 2022-02-09 PROCEDURE — 6370000000 HC RX 637 (ALT 250 FOR IP): Performed by: OBSTETRICS & GYNECOLOGY

## 2022-02-09 RX ADMIN — IBUPROFEN 800 MG: 800 TABLET, FILM COATED ORAL at 14:03

## 2022-02-09 RX ADMIN — ACETAMINOPHEN 650 MG: 325 TABLET ORAL at 19:32

## 2022-02-09 RX ADMIN — DOCUSATE SODIUM 100 MG: 100 CAPSULE ORAL at 07:52

## 2022-02-09 RX ADMIN — IBUPROFEN 800 MG: 800 TABLET, FILM COATED ORAL at 07:52

## 2022-02-09 ASSESSMENT — PAIN SCALES - GENERAL
PAINLEVEL_OUTOF10: 2
PAINLEVEL_OUTOF10: 3
PAINLEVEL_OUTOF10: 3

## 2022-02-09 NOTE — PLAN OF CARE
Problem: Anxiety:  Goal: Level of anxiety will decrease  Description: Level of anxiety will decrease  2/8/2022 2109 by Duane Horseman, RN  Outcome: Completed  2/8/2022 1457 by Evert Diane RN  Outcome: Ongoing     Problem: Breathing Pattern - Ineffective:  Goal: Able to breathe comfortably  Description: Able to breathe comfortably  2/8/2022 2109 by Duane Horseman, RN  Outcome: Completed  2/8/2022 1457 by Evert Diane RN  Outcome: Ongoing     Problem: Fluid Volume - Imbalance:  Goal: Absence of imbalanced fluid volume signs and symptoms  Description: Absence of imbalanced fluid volume signs and symptoms  2/8/2022 1457 by Evert Diane RN  Outcome: Ongoing  Goal: Absence of intrapartum hemorrhage signs and symptoms  Description: Absence of intrapartum hemorrhage signs and symptoms  2/8/2022 2109 by Duane Horseman, RN  Outcome: Completed  2/8/2022 1457 by Evert Diane RN  Outcome: Ongoing     Problem: Infection - Intrapartum Infection:  Goal: Will show no infection signs and symptoms  Description: Will show no infection signs and symptoms  2/8/2022 2109 by Duane Horseman, RN  Outcome: Completed  2/8/2022 1457 by Evert Diane RN  Outcome: Ongoing     Problem: Labor Process - Prolonged:  Goal: Labor progression, first stage, within specified pattern  Description: Labor progression, first stage, within specified pattern  2/8/2022 2109 by Duane Horseman, RN  Outcome: Completed  2/8/2022 1457 by Evert Diane RN  Outcome: Ongoing  Goal: Labor progession, second stage, within specified pattern  Description: Labor progession, second stage, within specified pattern  2/8/2022 2109 by Duane Horseman, RN  Outcome: Completed  2/8/2022 1457 by Evert Diane RN  Outcome: Ongoing  Goal: Uterine contractions within specified parameters  Description: Uterine contractions within specified parameters  2/8/2022 2109 by Duane Horseman, RN  Outcome: Completed  2/8/2022 1457 by Evert Diane RN  Outcome: Ongoing Problem:  Screening:  Goal: Ability to make informed decisions regarding treatment has improved  Description: Ability to make informed decisions regarding treatment has improved  2022 by Bobby Ying RN  Outcome: Completed  2022 by Bernard Eastman RN  Outcome: Ongoing     Problem: Pain - Acute:  Goal: Pain level will decrease  Description: Pain level will decrease  2022 by Bernard Eastman RN  Outcome: Ongoing  Goal: Able to cope with pain  Description: Able to cope with pain  2022 by Bernard Eastman RN  Outcome: Ongoing     Problem: Tissue Perfusion - Uteroplacental, Altered:  Goal: Absence of abnormal fetal heart rate pattern  Description: Absence of abnormal fetal heart rate pattern  2022 by Bobby Ying RN  Outcome: Completed  2022 by Bernard Eastman RN  Outcome: Ongoing     Problem: Urinary Retention:  Goal: Experiences of bladder distention will decrease  Description: Experiences of bladder distention will decrease  2022 by Bobby Ying RN  Outcome: Completed  2022 by Bernard Eastman RN  Outcome: Ongoing  Goal: Urinary elimination within specified parameters  Description: Urinary elimination within specified parameters  2022 by Bobby Ying RN  Outcome: Completed  2022 by Bernard Eastman RN  Outcome: Ongoing

## 2022-02-09 NOTE — H&P
Department of Obstetrics and Gynecology   Obstetrics History and Physical        CHIEF COMPLAINT:  Active labor. HISTORY OF PRESENT ILLNESS:      The patient is a 33 y.o. female at 39w6d. OB History        3    Para   1    Term   1            AB   1    Living   1       SAB   1    IAB        Ectopic        Molar        Multiple   0    Live Births   1            Patient presents in active labor. Estimated Due Date: Estimated Date of Delivery: 2/3/22    PRENATAL CARE:    Complicated by: none    PAST OB HISTORY:  OB History        3    Para   1    Term   1            AB   1    Living   1       SAB   1    IAB        Ectopic        Molar        Multiple   0    Live Births   1                Past Medical History:        Diagnosis Date    Anemia     Seizures ([address]     febrile seizures when 2 yr old     Past Surgical History:        Procedure Laterality Date    TONSILLECTOMY       Allergies:  Rabbit epithelium and Shrimp (diagnostic)    Social History:    Social History     Socioeconomic History    Marital status: Single     Spouse name: Not on file    Number of children: Not on file    Years of education: Not on file    Highest education level: Not on file   Occupational History    Not on file   Tobacco Use    Smoking status: Never Smoker    Smokeless tobacco: Never Used   Vaping Use    Vaping Use: Never used   Substance and Sexual Activity    Alcohol use: Not Currently    Drug use: Never    Sexual activity: Yes     Partners: Male   Other Topics Concern    Not on file   Social History Narrative    Not on file     Social Determinants of Health     Financial Resource Strain:     Difficulty of Paying Living Expenses: Not on file   Food Insecurity:     Worried About Running Out of Food in the Last Year: Not on file    Brianna of Food in the Last Year: Not on file   Transportation Needs:     Lack of Transportation (Medical):  Not on file    Lack of Transportation (Non-Medical): Not on file   Physical Activity:     Days of Exercise per Week: Not on file    Minutes of Exercise per Session: Not on file   Stress:     Feeling of Stress : Not on file   Social Connections:     Frequency of Communication with Friends and Family: Not on file    Frequency of Social Gatherings with Friends and Family: Not on file    Attends Hoahaoism Services: Not on file    Active Member of [address] [place] [address] or Organizations: Not on file    Attends Club or Organization Meetings: Not on file    Marital Status: Not on file   Intimate Partner Violence:     Fear of Current or Ex-Partner: Not on file    Emotionally Abused: Not on file    Physically Abused: Not on file    Sexually Abused: Not on file   Housing Stability:     Unable to Pay for Housing in the Last Year: Not on file    Number of [place] in the Last Year: Not on file    Unstable Housing in the Last Year: Not on file     Family History:       Problem Relation Age of Onset    Diabetes Mother      Medications Prior to Admission:  Medications Prior to Admission: Prenatal MV-Min-Fe Fum-FA-DHA (PRENATAL 1 PO), Take by mouth  acetaminophen (TYLENOL) 500 MG tablet, Take 500 mg by mouth every 6 hours as needed for Pain    REVIEW OF SYSTEMS:    CONSTITUTIONAL:  negative  RESPIRATORY:  negative  CARDIOVASCULAR:  negative  GASTROINTESTINAL:  negative  ALLERGIC/IMMUNOLOGIC:  negative  NEUROLOGICAL:  negative  BEHAVIOR/PSYCH:  negative    PHYSICAL EXAM:  Vitals:    02/08/22 2045 02/08/22 2047 02/08/22 2052 02/08/22 2100   BP: (!) 100/56 (!) 96/57 (!) 96/59 101/67   Pulse: 85 82 84 81   Resp:    18   Temp:    98.4 °F (36.9 °C)   TempSrc:    Oral   SpO2:       Weight:       Height:         General appearance:  awake, alert, cooperative, no apparent distress, and appears stated age  HEENT: KARRIE EOMI, trachea is midline, no thyroidmegaly  Heart: Regular rate and rhythm without murmur  Neurologic:  Awake, alert, oriented to name, place and time. Cranial nerves II-X11 grossly intact. Patellar DTR's 2/4 bilaterally. No clonus  Lungs:  No increased work of breathing, good air exchange  Abdomen:  Soft, non tender, gravid, consistent with her gestational age. Fundal height 38 CM  Fetal heart rate:  Reassuring, Cat 1, appropriate for gestational age  Pelvis:  Adequate pelvis  Cervix: 4 cm 75% soft -1  Contraction frequency:  6 minutes    Membranes:  Intact    Labs:   CBC:   Lab Results   Component Value Date    WBC 12.6 02/08/2022    RBC 3.79 02/08/2022    RBC 4.45 05/21/2012    HGB 10.7 02/08/2022    HCT 32.6 02/08/2022    MCV 86.0 02/08/2022    MCH 28.2 02/08/2022    MCHC 32.8 02/08/2022    RDW 13.3 02/08/2022     02/08/2022     05/21/2012    MPV 9.7 02/08/2022       Tracing is reassuring. ASSESSMENT: Term Intrauterine Pregnancy in active labor. PLAN: Antepartum labor protocol.        GBS: Negative

## 2022-02-09 NOTE — PROGRESS NOTES
Dr. Johny Acevedo / Mohan Samia Partum Note    Post-partum Day #1    Subjective:  Pain is : Mild  Lochia: thin lochia  Nausea: None  Bowel Movement/Flatus:  yes  Breastfeeding do not: plans to breastfeed    Objective:Patient Vitals for the past 24 hrs:   BP Temp Temp src Pulse Resp SpO2   02/09/22 1200 (!) 99/51 98.1 °F (36.7 °C) Oral 90 16    02/09/22 0758 120/75 98.1 °F (36.7 °C) Oral 97 16    02/09/22 0428 (!) 93/51 98.2 °F (36.8 °C) Oral 79 18    02/09/22 0018 (!) 116/55 98.4 °F (36.9 °C) Oral 74 16    02/08/22 2332 109/69   77     02/08/22 2317 109/78   99     02/08/22 2302 112/70   73     02/08/22 2247 110/68   82 16    02/08/22 2231 105/68   75     02/08/22 2217 108/62   77     02/08/22 2202 (!) 109/59   71 16    02/08/22 2147 (!) 108/56   82     02/08/22 2132 107/65   71     02/08/22 2118    95     02/08/22 2115 (!) 121/59        02/08/22 2100 101/67 98.4 °F (36.9 °C) Oral 81 18    02/08/22 2054      98 %   02/08/22 2052 (!) 96/59   84     02/08/22 2049      97 %   02/08/22 2047 (!) 96/57   82     02/08/22 2045 (!) 100/56   85     02/08/22 2044      97 %   02/08/22 2039      97 %   02/08/22 2037 107/62   88     02/08/22 2034      97 %   02/08/22 2032 106/61   92     02/08/22 2029      99 %   02/08/22 2024      98 %   02/08/22 2019      92 %   02/08/22 2018      95 %   02/08/22 2017 (!) 111/58   81     02/08/22 2014      98 %   02/08/22 2009      100 %   02/08/22 2004      99 %   02/08/22 2003 109/68   80     02/08/22 1959      99 %   02/08/22 1954      98 %   02/08/22 1949      99 %   02/08/22 1947 124/60   71     02/08/22 1944      98 %   02/08/22 1939      98 %   02/08/22 1934      96 %   02/08/22 1932 (!) 100/59 98.4 °F (36.9 °C) Oral 92 18    02/08/22 1929 Christa Perry   96 %   02/08/22 1924      97 %   02/08/22 1919      99 %   02/08/22 1917 (!) 99/56   89     02/08/22 1914 Arcelia Caulk   99 %   02/08/22 1908 Arcelia Caulk   99 %   02/08/22 1904 Arcelia Caulk   99 %   02/08/22 1902 (!) 101/59   73     02/08/22 1900 (!) 101/59     98 %   02/08/22 1859      98 %   02/08/22 1854      99 %   02/08/22 1849      99 %   02/08/22 1847 (!) 101/55   76     02/08/22 1844      98 %   02/08/22 1839      99 %   02/08/22 1834      99 %      Abdominal exam: soft, nontender, nondistended, no masses or organomegaly.      Wound: None    Perineum: clean, dry, intact and nontender    Lab Results   Component Value Date    WBC 12.6 (H) 02/08/2022    HGB 10.7 (L) 02/08/2022    HCT 32.6 (L) 02/08/2022    MCV 86.0 02/08/2022     02/08/2022          Current Facility-Administered Medications:     lactated ringers infusion 500 mL, 500 mL, IntraVENous, PRN **OR** lactated ringers infusion 1,000 mL, 1,000 mL, IntraVENous, PRN, Nik Bertrand MD, Last Rate: 999 mL/hr at 02/08/22 1626, Rate Change at 02/08/22 1626    sodium chloride flush 0.9 % injection 5-40 mL, 5-40 mL, IntraVENous, 2 times per day, Nik Bertrand MD    sodium chloride flush 0.9 % injection 5-40 mL, 5-40 mL, IntraVENous, PRN, Nik Bertrand MD    0.9 % sodium chloride infusion, 25 mL, IntraVENous, PRN, Nik Bertrand MD    lidocaine PF 1 % injection 30 mL, 30 mL, Other, PRN, Nik Bertrand MD    nalbuphine (NUBAIN) injection 10 mg, 10 mg, IntraVENous, Q2H PRN, Nik Bertrand MD    nitrous oxide 50% inhalation 1 each, 1 each, Inhalation, Continuous PRN, Nik Bertrand MD    ondansetron (ZOFRAN) injection 4 mg, 4 mg, IntraVENous, Q6H PRN, Nik Bertrand MD    oxytocin (PITOCIN) 30 units in 500 mL infusion, 166 earle-units/min, IntraVENous, PRN **AND** oxytocin (PITOCIN) 10 unit bolus from the bag, 999 mL/hr, IntraVENous, PRN, Nik Bertrand MD    methylergonovine (METHERGINE) injection 200 mcg, 200 mcg, IntraMUSCular, PRN, Nik Bertrand MD    carboprost (Ludie Renny) injection 250 mcg, 250 mcg, IntraMUSCular, PRN, Marleny Jenkins MD    miSOPROStol (CYTOTEC) tablet 900 mcg, 900 mcg, Rectal, PRN, Marleny Jenkins MD    ePHEDrine injection 5 mg, 5 mg, IntraVENous, Q5 Min PRN, ABRAHAM Jerez CRNA    naloxone [facility]) injection 0.4 mg, 0.4 mg, IntraVENous, PRN, ABRAHAM Jerez CRNA    lactated ringers infusion 500 mL, 500 mL, IntraVENous, PRN **OR** lactated ringers infusion 1,000 mL, 1,000 mL, IntraVENous, PRN, Marleny Jenkins MD    sodium chloride flush 0.9 % injection 5-40 mL, 5-40 mL, IntraVENous, 2 times per day, Marleny Jenkins MD    sodium chloride flush 0.9 % injection 5-40 mL, 5-40 mL, IntraVENous, PRN, Marleny Jenkins MD    0.9 % sodium chloride infusion, 25 mL, IntraVENous, PRN, Marleny Jenkins MD    acetaminophen (TYLENOL) tablet 650 mg, 650 mg, Oral, Q4H PRN, Marleny Jenkins MD    ibuprofen (ADVIL;MOTRIN) tablet 800 mg, 800 mg, Oral, 3 times per day, Marleny Jenkins MD, 800 mg at 02/09/22 1403    witch hazel-glycerin (TUCKS) pad, , Topical, PRN, Marleny Jenkins MD, 36 each at 02/08/22 2335    benzocaine-menthol (DERMOPLAST) 20-0.5 % spray, , Topical, PRN, Marleny Jenkins MD, Given at 02/08/22 2334    docusate sodium (COLACE) capsule 100 mg, 100 mg, Oral, BID, Marleny Jenkins MD, 100 mg at 02/09/22 7726     Assessment: Status post partum. Stable. Plan:  Continue same management.

## 2022-02-09 NOTE — FLOWSHEET NOTE
Notified Dr Laurie Alvarez of delivery. No new orders at this time - to call her if any concerns arise.

## 2022-02-09 NOTE — PLAN OF CARE
Problem: Anxiety:  Goal: Level of anxiety will decrease  Description: Level of anxiety will decrease  2/8/2022 2109 by Addis Balderas RN  Outcome: Completed  2/8/2022 1457 by Megha Naylor RN  Outcome: Ongoing     Problem: Breathing Pattern - Ineffective:  Goal: Able to breathe comfortably  Description: Able to breathe comfortably  2/8/2022 2109 by Addis Balderas RN  Outcome: Completed  2/8/2022 1457 by Megha Naylor RN  Outcome: Ongoing     Problem: Fluid Volume - Imbalance:  Goal: Absence of imbalanced fluid volume signs and symptoms  Description: Absence of imbalanced fluid volume signs and symptoms  2/8/2022 2110 by Addis Balderas RN  Outcome: Ongoing  2/8/2022 1457 by Megha Naylor RN  Outcome: Ongoing  Goal: Absence of intrapartum hemorrhage signs and symptoms  Description: Absence of intrapartum hemorrhage signs and symptoms  2/8/2022 2109 by Addis Balderas RN  Outcome: Completed  2/8/2022 1457 by Megha Naylor RN  Outcome: Ongoing     Problem: Infection - Intrapartum Infection:  Goal: Will show no infection signs and symptoms  Description: Will show no infection signs and symptoms  2/8/2022 2109 by Addis Balderas RN  Outcome: Completed  2/8/2022 1457 by Megha Naylor RN  Outcome: Ongoing

## 2022-02-09 NOTE — LACTATION NOTE
Lactation education:    [x] Latch/ good latch vs shallow latch/ steps to obtaining deep latch    [x] How to know if infant is eating enough/ feedings per 24 hours, wet/dirty diapers    [x] Feeding/satiety cues      Lactation education resources given:     [x]  How to Breastfeed your baby - [address] publication      [x]  Follow up support information    [x]  Breast milk storage guidelines - [facility]    [x]  Breastpump cleaning guidelines - [facility]     [x]  Breastfeeding & Safe Sleep handout - [address] publication    [x]  Calling All Dads! Handout - [address] publication      []  Breast and Nipple Care - [facility]     []  [address]    []  [place]    []  Going Back to Work - [facility]    []  Preventing Engorgement - [place]    Supplies given:    []  Brush, soap and basin for breastpump cleaning    []  Insurance pump provided     []  [facility] pump set up for patient to use    Explained to patient, patient verbalizes understanding.         Signed:  Juvenal Singh RN, BSN, IBCLC

## 2022-02-09 NOTE — L&D DELIVERY SUMMARY NOTE
Department of Obstetrics and Gynecology  Spontaneous Vaginal Delivery Note         Pre-operative Diagnosis:  Term pregnancy    Post-operative Diagnosis:  Same, delivered, Term single living child    Procedure:  Spontaneous vaginal delivery    Surgeon:  Paloma Garcia M.D. Information for the patient's : Rosalio Javed Girl Rhonda [[id number]]          Anesthesia:  epidural anesthesia    Estimated blood loss:  300ml    Specimen:  Placenta was sent for pathological analysis. Umbilical cord blood sent Yes    Umbilical cord blood gases: not required    Complications:  1 cm right upper labia minora laceration. (2cm away from clitoris)    Condition:  infant stable to general nursery    Details of Procedure: The patient is a 33 y.o. female at 39w6d   OB History        3    Para   1    Term   1            AB   1    Living   1       SAB   1    IAB        Ectopic        Molar        Multiple   0    Live Births   1             who was admitted for active phase labor. She received the following interventions: none The patient progressed well,did receive an epidural, became complete and started to push. After pushing for 5 minutes the fetal head was at the perineum. A median episiotomy was performed. The fetal head was then gently delivered. The nose and mouth were suctioned with bulb suction and the rest of the infant delivered atraumatically, placed on mother abdomen. The cord was clamped and cut and infant handed off to the waiting nurse for evaluation. The delivery of the placenta was spontaneous. The perineum and vagina were explored and a 1 cm right  labia minora laceration was repaired with interrupted 3-0 chromic stitches. A median episiotomy was repaired in standard fashion using 2-0 chromic.

## 2022-02-10 VITALS
SYSTOLIC BLOOD PRESSURE: 108 MMHG | HEIGHT: 63 IN | HEART RATE: 82 BPM | WEIGHT: 155 LBS | RESPIRATION RATE: 16 BRPM | TEMPERATURE: 98.2 F | DIASTOLIC BLOOD PRESSURE: 72 MMHG | OXYGEN SATURATION: 98 % | BODY MASS INDEX: 27.46 KG/M2

## 2022-02-10 LAB — SURGICAL PATHOLOGY REPORT: NORMAL

## 2022-02-10 PROCEDURE — 99024 POSTOP FOLLOW-UP VISIT: CPT | Performed by: OBSTETRICS & GYNECOLOGY

## 2022-02-10 PROCEDURE — 6370000000 HC RX 637 (ALT 250 FOR IP): Performed by: OBSTETRICS & GYNECOLOGY

## 2022-02-10 RX ADMIN — IBUPROFEN 800 MG: 800 TABLET, FILM COATED ORAL at 11:20

## 2022-02-10 RX ADMIN — DOCUSATE SODIUM 100 MG: 100 CAPSULE ORAL at 01:54

## 2022-02-10 RX ADMIN — IBUPROFEN 800 MG: 800 TABLET, FILM COATED ORAL at 01:54

## 2022-02-10 ASSESSMENT — PAIN SCALES - GENERAL
PAINLEVEL_OUTOF10: 3
PAINLEVEL_OUTOF10: 3

## 2022-02-10 NOTE — PLAN OF CARE
Problem: Fluid Volume - Imbalance:  Goal: Absence of imbalanced fluid volume signs and symptoms  Description: Absence of imbalanced fluid volume signs and symptoms  Outcome: Ongoing     Problem: Pain - Acute:  Goal: Pain level will decrease  Description: Pain level will decrease  Outcome: Ongoing  Goal: Able to cope with pain  Description: Able to cope with pain  Outcome: Ongoing     Problem: Falls - Risk of:  Goal: Will remain free from falls  Description: Will remain free from falls  Outcome: Ongoing  Goal: Absence of physical injury  Description: Absence of physical injury  Outcome: Ongoing     Problem: VAGINAL DELIVERY - RECOVERY AND POST PARTUM  Goal: Vital signs are medically acceptable  Outcome: Ongoing  Goal: Patient will remain free of falls  Outcome: Ongoing  Goal: Fundus firm at midline  Outcome: Ongoing  Goal: Moderate rubra without clots, no purulent discharge, no foul smelling lochia  Outcome: Ongoing  Goal: Empties bladder  Outcome: Ongoing  Goal: Verbalizes understanding of normal bowel function resumption  Outcome: Ongoing  Goal: Edema will be absent or minimal  Outcome: Ongoing  Goal: Breasts are soft with nipple integrity intact  Outcome: Ongoing  Goal: Demonstrates appropriate breast feeding techniques  Outcome: Ongoing  Goal: Appropriate behavior observed  Outcome: Ongoing  Goal: Positive Mother-Baby interactions are observed  Outcome: Ongoing  Goal: Perineum intact without discharge or hematoma  Outcome: Ongoing  Goal: Ambulates independently  Outcome: Ongoing     Problem: PAIN  Goal: Patient's pain/discomfort is manageable  Outcome: Ongoing     Problem: KNOWLEDGE DEFICIT  Goal: Patient/S.O. demonstrates understanding of disease process, treatment plan, medications, and discharge instructions.   Outcome: Ongoing     Problem: Pain:  Goal: Pain level will decrease  Description: Pain level will decrease  Outcome: Ongoing  Goal: Control of acute pain  Description: Control of acute pain  Outcome: Ongoing  Goal: Control of chronic pain  Description: Control of chronic pain  Outcome: Ongoing

## 2022-02-10 NOTE — DISCHARGE SUMMARY
Vaginal Delivery Discharge Summary    Gestational Age:40w5d    Antepartum complications: none    Date of Delivery:   Information for the patient's : [employer] Benny Ellis [[id number]]   2022         Type of Delivery: Vaginal     Labs: CBC   Lab Results   Component Value Date    HGB 10.7 (L) 2022    HCT 32.6 (L) 2022        Intrapartum complications: None    Postpartum complications: none    Discharge Medications:      Medication List      ASK your doctor about these medications    acetaminophen 500 MG tablet  Commonly known as: TYLENOL     PRENATAL 1 PO            The patient is ambulating well. The patient is tolerating a normal diet.     Discharged to: Home     Discharge Date: 2/10/2022    Plan:   Follow up in 6 week(s) wit Dr Kaylie Foote MD

## 2022-02-10 NOTE — FLOWSHEET NOTE
Discharge instructions reviewed with pt. Verb understanding of self and infant care and follow up visits. Pt denies further needs.

## 2023-11-29 ENCOUNTER — HOSPITAL ENCOUNTER (EMERGENCY)
Age: 28
Discharge: HOME | End: 2023-11-29
Payer: MEDICAID

## 2023-11-29 VITALS
RESPIRATION RATE: 18 BRPM | BODY MASS INDEX: 22.15 KG/M2 | OXYGEN SATURATION: 97 % | DIASTOLIC BLOOD PRESSURE: 91 MMHG | HEART RATE: 92 BPM | WEIGHT: 125 LBS | SYSTOLIC BLOOD PRESSURE: 132 MMHG | HEIGHT: 63 IN | TEMPERATURE: 98 F

## 2023-11-29 DIAGNOSIS — J40 BRONCHITIS: Primary | ICD-10-CM

## 2023-11-29 PROCEDURE — 6370000000 HC RX 637 (ALT 250 FOR IP)

## 2023-11-29 PROCEDURE — 99213 OFFICE O/P EST LOW 20 MIN: CPT

## 2023-11-29 PROCEDURE — 94640 AIRWAY INHALATION TREATMENT: CPT

## 2023-11-29 RX ORDER — DEXTROMETHORPHAN HYDROBROMIDE AND PROMETHAZINE HYDROCHLORIDE 15; 6.25 MG/5ML; MG/5ML
5 SYRUP ORAL 4 TIMES DAILY PRN
Qty: 118 ML | Refills: 0 | Status: SHIPPED | OUTPATIENT
Start: 2023-11-29 | End: 2023-12-06

## 2023-11-29 RX ORDER — BENZONATATE 200 MG/1
200 CAPSULE ORAL 3 TIMES DAILY PRN
Qty: 30 CAPSULE | Refills: 0 | Status: SHIPPED | OUTPATIENT
Start: 2023-11-29 | End: 2023-12-09

## 2023-11-29 RX ORDER — ALBUTEROL SULFATE 90 UG/1
2 AEROSOL, METERED RESPIRATORY (INHALATION) 4 TIMES DAILY PRN
Qty: 54 G | Refills: 1 | Status: SHIPPED | OUTPATIENT
Start: 2023-11-29

## 2023-11-29 RX ORDER — IPRATROPIUM BROMIDE AND ALBUTEROL SULFATE 2.5; .5 MG/3ML; MG/3ML
SOLUTION RESPIRATORY (INHALATION)
Status: COMPLETED
Start: 2023-11-29 | End: 2023-11-29

## 2023-11-29 RX ORDER — PREDNISONE 20 MG/1
20 TABLET ORAL 2 TIMES DAILY
Qty: 10 TABLET | Refills: 0 | Status: SHIPPED | OUTPATIENT
Start: 2023-11-29 | End: 2023-12-04

## 2023-11-29 RX ORDER — IPRATROPIUM BROMIDE AND ALBUTEROL SULFATE 2.5; .5 MG/3ML; MG/3ML
1 SOLUTION RESPIRATORY (INHALATION)
Status: DISCONTINUED | OUTPATIENT
Start: 2023-11-29 | End: 2023-11-29 | Stop reason: HOSPADM

## 2023-11-29 RX ADMIN — IPRATROPIUM BROMIDE AND ALBUTEROL SULFATE 1 DOSE: 2.5; .5 SOLUTION RESPIRATORY (INHALATION) at 12:30

## 2023-11-29 RX ADMIN — IPRATROPIUM BROMIDE AND ALBUTEROL SULFATE 1 DOSE: .5; 3 SOLUTION RESPIRATORY (INHALATION) at 12:30

## 2023-11-29 ASSESSMENT — PAIN - FUNCTIONAL ASSESSMENT
PAIN_FUNCTIONAL_ASSESSMENT: NONE - DENIES PAIN
PAIN_FUNCTIONAL_ASSESSMENT: 0-10

## 2023-11-29 ASSESSMENT — ENCOUNTER SYMPTOMS
SINUS PRESSURE: 0
SINUS PAIN: 0
SHORTNESS OF BREATH: 1
COUGH: 1
VOICE CHANGE: 1
DIARRHEA: 0
CHEST TIGHTNESS: 0
ABDOMINAL PAIN: 0
NAUSEA: 0
SORE THROAT: 0
CONSTIPATION: 0
VOMITING: 0
WHEEZING: 1

## 2023-11-29 ASSESSMENT — PAIN DESCRIPTION - LOCATION: LOCATION: CHEST

## 2023-11-29 ASSESSMENT — PAIN DESCRIPTION - PAIN TYPE: TYPE: ACUTE PAIN

## 2023-11-29 ASSESSMENT — PAIN SCALES - GENERAL: PAINLEVEL_OUTOF10: 7

## 2023-11-29 ASSESSMENT — PAIN DESCRIPTION - ORIENTATION: ORIENTATION: MID

## 2023-11-29 ASSESSMENT — PAIN DESCRIPTION - FREQUENCY: FREQUENCY: INTERMITTENT

## 2023-11-29 ASSESSMENT — PAIN DESCRIPTION - ONSET: ONSET: GRADUAL

## 2023-11-29 ASSESSMENT — PAIN DESCRIPTION - DESCRIPTORS: DESCRIPTORS: DISCOMFORT;TIGHTNESS

## 2023-11-29 NOTE — ED TRIAGE NOTES
Arrives to [facility] for the evaluation of sore throat (x2 days), difficulty breathing, \"Raspy\" breathing, wheezing, mid chest tightness with deep breaths, non productive cough. Reports Hx of asthma. Took Dayquil 2 days ago but nothing since. Skin/lips pale. Respirations unlabored with a weak cough. Has posterior lung inspiratory and expiratory wheezing. Afebrile. Waiting provider to assess for orders.

## 2024-08-12 ENCOUNTER — APPOINTMENT (OUTPATIENT)
Dept: GENERAL RADIOLOGY | Age: 29
End: 2024-08-12
Payer: MEDICAID

## 2024-08-12 ENCOUNTER — APPOINTMENT (OUTPATIENT)
Dept: CT IMAGING | Age: 29
End: 2024-08-12
Payer: MEDICAID

## 2024-08-12 ENCOUNTER — HOSPITAL ENCOUNTER (EMERGENCY)
Age: 29
Discharge: HOME | End: 2024-08-12
Payer: MEDICAID

## 2024-08-12 VITALS
DIASTOLIC BLOOD PRESSURE: 71 MMHG | TEMPERATURE: 98.7 F | SYSTOLIC BLOOD PRESSURE: 96 MMHG | HEART RATE: 111 BPM | OXYGEN SATURATION: 94 % | WEIGHT: 122 LBS | RESPIRATION RATE: 19 BRPM | BODY MASS INDEX: 21.61 KG/M2

## 2024-08-12 DIAGNOSIS — J45.21 MILD INTERMITTENT ASTHMA WITH ACUTE EXACERBATION: Primary | ICD-10-CM

## 2024-08-12 LAB
ANION GAP SERPL CALC-SCNC: 11 MEQ/L (ref 8–16)
B-HCG SERPL QL: NEGATIVE
BASOPHILS ABSOLUTE: 0.1 THOU/MM3 (ref 0–0.1)
BASOPHILS NFR BLD AUTO: 0.6 %
BUN SERPL-MCNC: 8 MG/DL (ref 7–22)
CALCIUM SERPL-MCNC: 8.6 MG/DL (ref 8.5–10.5)
CHLORIDE SERPL-SCNC: 105 MEQ/L (ref 98–111)
CO2 SERPL-SCNC: 24 MEQ/L (ref 23–33)
CREAT SERPL-MCNC: 0.8 MG/DL (ref 0.4–1.2)
D DIMER PPP IA.FEU-MCNC: 956 NG/ML FEU (ref 0–500)
DEPRECATED RDW RBC AUTO: 44.1 FL (ref 35–45)
EKG ATRIAL RATE: 93 BPM
EKG P AXIS: 87 DEGREES
EKG P-R INTERVAL: 130 MS
EKG Q-T INTERVAL: 358 MS
EKG QRS DURATION: 72 MS
EKG QTC CALCULATION (BAZETT): 445 MS
EKG R AXIS: 82 DEGREES
EKG T AXIS: 101 DEGREES
EKG VENTRICULAR RATE: 93 BPM
EOSINOPHIL NFR BLD AUTO: 8.8 %
EOSINOPHILS ABSOLUTE: 1 THOU/MM3 (ref 0–0.4)
ERYTHROCYTE [DISTWIDTH] IN BLOOD BY AUTOMATED COUNT: 13.3 % (ref 11.5–14.5)
FLUAV RNA RESP QL NAA+PROBE: NOT DETECTED
FLUBV RNA RESP QL NAA+PROBE: NOT DETECTED
GFR SERPL CREATININE-BSD FRML MDRD: > 90 ML/MIN/1.73M2
GLUCOSE SERPL-MCNC: 100 MG/DL (ref 70–108)
HCT VFR BLD AUTO: 42.7 % (ref 37–47)
HGB BLD-MCNC: 14.3 GM/DL (ref 12–16)
IMM GRANULOCYTES # BLD AUTO: 0.01 THOU/MM3 (ref 0–0.07)
IMM GRANULOCYTES NFR BLD AUTO: 0.1 %
LACTATE SERPL-SCNC: 0.5 MMOL/L (ref 0.5–2)
LYMPHOCYTES ABSOLUTE: 1.1 THOU/MM3 (ref 1–4.8)
LYMPHOCYTES NFR BLD AUTO: 9.9 %
MCH RBC QN AUTO: 30.2 PG (ref 26–33)
MCHC RBC AUTO-ENTMCNC: 33.5 GM/DL (ref 32.2–35.5)
MCV RBC AUTO: 90.3 FL (ref 81–99)
MONOCYTES ABSOLUTE: 0.5 THOU/MM3 (ref 0.4–1.3)
MONOCYTES NFR BLD AUTO: 4.8 %
NEUTROPHILS ABSOLUTE: 8.6 THOU/MM3 (ref 1.8–7.7)
NEUTROPHILS NFR BLD AUTO: 75.8 %
NRBC BLD AUTO-RTO: 0 /100 WBC
OSMOLALITY SERPL CALC.SUM OF ELEC: 277.8 MOSMOL/KG (ref 275–300)
PLATELET # BLD AUTO: 241 THOU/MM3 (ref 130–400)
PMV BLD AUTO: 9.6 FL (ref 9.4–12.4)
POTASSIUM SERPL-SCNC: 4.5 MEQ/L (ref 3.5–5.2)
PROCALCITONIN SERPL IA-MCNC: 0.04 NG/ML (ref 0.01–0.09)
RBC # BLD AUTO: 4.73 MILL/MM3 (ref 4.2–5.4)
SARS-COV-2 RNA RESP QL NAA+PROBE: NOT DETECTED
SODIUM SERPL-SCNC: 140 MEQ/L (ref 135–145)
WBC # BLD AUTO: 11.4 THOU/MM3 (ref 4.8–10.8)

## 2024-08-12 PROCEDURE — 94640 AIRWAY INHALATION TREATMENT: CPT

## 2024-08-12 PROCEDURE — 93005 ELECTROCARDIOGRAM TRACING: CPT | Performed by: NURSE PRACTITIONER

## 2024-08-12 PROCEDURE — 6360000004 HC RX CONTRAST MEDICATION: Performed by: NURSE PRACTITIONER

## 2024-08-12 PROCEDURE — 85379 FIBRIN DEGRADATION QUANT: CPT

## 2024-08-12 PROCEDURE — 6370000000 HC RX 637 (ALT 250 FOR IP): Performed by: NURSE PRACTITIONER

## 2024-08-12 PROCEDURE — 99285 EMERGENCY DEPT VISIT HI MDM: CPT

## 2024-08-12 PROCEDURE — 71275 CT ANGIOGRAPHY CHEST: CPT

## 2024-08-12 PROCEDURE — 84703 CHORIONIC GONADOTROPIN ASSAY: CPT

## 2024-08-12 PROCEDURE — 93010 ELECTROCARDIOGRAM REPORT: CPT | Performed by: NUCLEAR MEDICINE

## 2024-08-12 PROCEDURE — 84145 PROCALCITONIN (PCT): CPT

## 2024-08-12 PROCEDURE — 71046 X-RAY EXAM CHEST 2 VIEWS: CPT

## 2024-08-12 PROCEDURE — 36415 COLL VENOUS BLD VENIPUNCTURE: CPT

## 2024-08-12 PROCEDURE — 80048 BASIC METABOLIC PNL TOTAL CA: CPT

## 2024-08-12 PROCEDURE — 87636 SARSCOV2 & INF A&B AMP PRB: CPT

## 2024-08-12 PROCEDURE — 83605 ASSAY OF LACTIC ACID: CPT

## 2024-08-12 PROCEDURE — 85025 COMPLETE CBC W/AUTO DIFF WBC: CPT

## 2024-08-12 RX ORDER — PREDNISONE 10 MG/1
TABLET ORAL
Qty: 20 TABLET | Refills: 0 | Status: SHIPPED | OUTPATIENT
Start: 2024-08-12 | End: 2024-08-22

## 2024-08-12 RX ORDER — BENZONATATE 100 MG/1
100 CAPSULE ORAL 3 TIMES DAILY PRN
Qty: 30 CAPSULE | Refills: 0 | Status: SHIPPED | OUTPATIENT
Start: 2024-08-12 | End: 2024-08-19

## 2024-08-12 RX ORDER — IPRATROPIUM BROMIDE AND ALBUTEROL SULFATE 2.5; .5 MG/3ML; MG/3ML
1 SOLUTION RESPIRATORY (INHALATION) ONCE
Status: COMPLETED | OUTPATIENT
Start: 2024-08-12 | End: 2024-08-12

## 2024-08-12 RX ORDER — PREDNISONE 20 MG/1
40 TABLET ORAL ONCE
Status: COMPLETED | OUTPATIENT
Start: 2024-08-12 | End: 2024-08-12

## 2024-08-12 RX ORDER — PREDNISONE 20 MG/1
40 TABLET ORAL 2 TIMES DAILY
Status: DISCONTINUED | OUTPATIENT
Start: 2024-08-12 | End: 2024-08-12

## 2024-08-12 RX ORDER — ALBUTEROL SULFATE 90 UG/1
2 AEROSOL, METERED RESPIRATORY (INHALATION)
Status: COMPLETED | OUTPATIENT
Start: 2024-08-12 | End: 2024-08-12

## 2024-08-12 RX ORDER — ALBUTEROL SULFATE 90 UG/1
2 AEROSOL, METERED RESPIRATORY (INHALATION) 4 TIMES DAILY PRN
Qty: 18 G | Refills: 0 | Status: SHIPPED | OUTPATIENT
Start: 2024-08-12

## 2024-08-12 RX ADMIN — PREDNISONE 40 MG: 20 TABLET ORAL at 12:44

## 2024-08-12 RX ADMIN — IOPAMIDOL 80 ML: 755 INJECTION, SOLUTION INTRAVENOUS at 16:06

## 2024-08-12 RX ADMIN — ALBUTEROL SULFATE 2 PUFF: 90 AEROSOL, METERED RESPIRATORY (INHALATION) at 16:32

## 2024-08-12 RX ADMIN — IPRATROPIUM BROMIDE AND ALBUTEROL SULFATE 1 DOSE: .5; 3 SOLUTION RESPIRATORY (INHALATION) at 12:51

## 2024-08-12 NOTE — ED PROVIDER NOTES
[facility] Emergency Department    CHIEF COMPLAINT       Chief Complaint   Patient presents with    Sinusitis       Nurses Notes reviewed and I agree except as noted in the HPI.    HISTORY OF PRESENT ILLNESS   Rhonda Phillips is a 29 y.o. female who presents to the ED for evaluation of sinusitis.  Patient reports for the last 2 days she has had increased congestion rhinorrhea and cough.  She notes shortness of breath associated with cough.  She reports coughing up green and yellow mucus.  She notes left-sided chest pain with cough.  She describes as an achy pain.  She notes chills but denies any known fever.  She notes she has had bronchitis in the past with similar symptoms.  She denies any history of known lung disease.  She notes not treating her symptoms with any medications.  She notes her last menstrual period was July 20.          HPI was provided by the patient.         PAST MEDICAL HISTORY     Past Medical History:   Diagnosis Date    Anemia     Seizures (HCC)     febrile seizures when 2 yr old       SURGICALHISTORY      has a past surgical history that includes Tonsillectomy.    CURRENT MEDICATIONS       Discharge Medication List as of 8/12/2024  4:26 PM        CONTINUE these medications which have NOT CHANGED    Details   Pseudoephedrine-APAP-DM (DAYQUIL PO) Take 2 capsules by mouth as neededHistorical Med      Prenatal MV-Min-Fe Fum-FA-DHA (PRENATAL 1 PO) Take by mouthHistorical Med      acetaminophen (TYLENOL) 500 MG tablet Take 500 mg by mouth every 6 hours as needed for PainHistorical Med             ALLERGIES     is allergic to rabbit epithelium and shrimp (diagnostic).    FAMILY HISTORY     She indicated that the status of her mother is unknown.   family history includes Diabetes in her mother.    SOCIAL HISTORY       Social History     Socioeconomic History    Marital status: Single     Spouse name: Not on file    Number of children: Not on file    Years of education: Not on file    Highest

## 2024-08-12 NOTE — ED NOTES
Iv inserted for CT. Pt updated on POC. VSS. Patient resting in bed. Respirations easy and unlabored. No distress noted. Call light within reach.

## 2024-08-12 NOTE — ED NOTES
Pt to ED via intake with c/o congestion, SOB and cough for the last 3 days. Pt reports they did a telehealth visit and was advised to come to ER. Upon arrival pt is SOB with exertion. Pt has a strong non productive cough. Pt reports having a hx of asthma. EKG completed. Pt HR tachycardic. Will continue to monitor.

## 2024-08-26 ENCOUNTER — OFFICE VISIT (OUTPATIENT)
Dept: FAMILY MEDICINE CLINIC | Age: 29
End: 2024-08-26
Payer: MEDICAID

## 2024-08-26 VITALS
BODY MASS INDEX: 21.62 KG/M2 | HEIGHT: 63 IN | SYSTOLIC BLOOD PRESSURE: 118 MMHG | RESPIRATION RATE: 12 BRPM | WEIGHT: 122 LBS | DIASTOLIC BLOOD PRESSURE: 64 MMHG | OXYGEN SATURATION: 98 % | HEART RATE: 64 BPM | TEMPERATURE: 98 F

## 2024-08-26 DIAGNOSIS — J45.909 MILD ASTHMA WITHOUT COMPLICATION, UNSPECIFIED WHETHER PERSISTENT: Primary | ICD-10-CM

## 2024-08-26 PROCEDURE — 99214 OFFICE O/P EST MOD 30 MIN: CPT

## 2024-08-26 RX ORDER — ESCITALOPRAM OXALATE 20 MG/1
20 TABLET ORAL DAILY
COMMUNITY

## 2024-08-26 SDOH — ECONOMIC STABILITY: FOOD INSECURITY: WITHIN THE PAST 12 MONTHS, THE FOOD YOU BOUGHT JUST DIDN'T LAST AND YOU DIDN'T HAVE MONEY TO GET MORE.: NEVER TRUE

## 2024-08-26 SDOH — ECONOMIC STABILITY: FOOD INSECURITY: WITHIN THE PAST 12 MONTHS, YOU WORRIED THAT YOUR FOOD WOULD RUN OUT BEFORE YOU GOT MONEY TO BUY MORE.: NEVER TRUE

## 2024-08-26 SDOH — ECONOMIC STABILITY: INCOME INSECURITY: HOW HARD IS IT FOR YOU TO PAY FOR THE VERY BASICS LIKE FOOD, HOUSING, MEDICAL CARE, AND HEATING?: NOT HARD AT ALL

## 2024-08-26 ASSESSMENT — PATIENT HEALTH QUESTIONNAIRE - PHQ9
SUM OF ALL RESPONSES TO PHQ QUESTIONS 1-9: 0
2. FEELING DOWN, DEPRESSED OR HOPELESS: NOT AT ALL
1. LITTLE INTEREST OR PLEASURE IN DOING THINGS: NOT AT ALL
SUM OF ALL RESPONSES TO PHQ9 QUESTIONS 1 & 2: 0
SUM OF ALL RESPONSES TO PHQ QUESTIONS 1-9: 0

## 2024-08-26 NOTE — PROGRESS NOTES
Health Maintenance Due   Topic Date Due    Depression Screen  Never done    Varicella vaccine (1 of 2 - 13+ 2-dose series) Never done    Hepatitis C screen  Never done    Hepatitis B vaccine (1 of 3 - 19+ 3-dose series) Never done    DTaP/Tdap/Td vaccine (1 - Tdap) Never done    Pap smear  Never done    COVID-19 Vaccine (1 - 2023-24 season) Never done    Flu vaccine (1) Never done

## 2024-08-26 NOTE — PROGRESS NOTES
Patient:Rhonda Phillips  YOB: 1995  MRN: [id number]    Subjective   29 y.o. female who presents for the following: ED Follow-up ([facility] ED 08/12/2024 Follow-up Mild intermittent asthma with acute exacerbation)    Patient states she has never been formally diagnosed with asthma but recently had multiple episodes of dyspnea needing ED visits over the last year. Patients states symptoms started for her about 4 yrs ago when she was exercising and had an episode of inability to catch her breath. She had a ED visit in 2024 for bronchiolitis and then in aug 2024 for asthma exacerbation. She completed a course of abx and steroids and is feeling much better. States that low impact activity does help.    HPI  Review of Systems   Review of Systems   Respiratory:  Negative for chest tightness, shortness of breath and wheezing.    All other systems reviewed and are negative.    Patient History    Past Medical History:  She has a past medical history of Anemia, Anxiety, and Seizures (HCC).    Social History:  She reports that she has never smoked. She has never used smokeless tobacco. She reports current alcohol use. She reports that she does not use drugs.     Past Surgical History:   Procedure Laterality Date    TONSILLECTOMY        Family History   Problem Relation Age of Onset    Diabetes Mother      Objective     Vitals:    08/26/24 1054   BP: 118/64   Site: Right Upper Arm   Position: Sitting   Cuff Size: Medium Adult   Pulse: 64   Resp: 12   Temp: 98 °F (36.7 °C)   TempSrc: Oral   SpO2: 98%   Weight: 55.3 kg (122 lb)   Height: 1.6 m (5' 2.99\")   Body mass index is 21.62 kg/m².    Physical Exam  Vitals reviewed.   Constitutional:       General: She is not in acute distress.     Appearance: Normal appearance. She is not ill-appearing.   HENT:      Head: Normocephalic and atraumatic.      Right Ear: External ear normal.      Left Ear: External ear normal.      Nose: Nose normal.   Eyes:      General:

## 2024-08-26 NOTE — PATIENT INSTRUCTIONS
Thank you   Thank you for trusting us with your healthcare needs. You may receive a survey regarding today's visit. It would help us out if you would take a few moments to provide your feedback. We value your input.  Please bring in ALL medications BOTTLES, including inhalers, herbal supplements, over the counter, prescribed & non-prescribed medicine. The office would like actual medication bottles and a list.         4.  Prior to getting your labs drawn, check with your insurance company for benefits and eligibility of lab services.  Often, insurance companies cover certain tests for preventative visits only.  It is patient's responsibility to    see what is covered.    5.  If the list below has been completed, PLEASE FAX RECORDS TO OUR OFFICE @ [phone]. Once the records have been received we will update your records at our office:  Health Maintenance Due   Topic Date Due    Depression Screen  Never done    Varicella vaccine (1 of 2 - 13+ 2-dose series) Never done    Hepatitis C screen  Never done    Hepatitis B vaccine (1 of 3 - 19+ 3-dose series) Never done    DTaP/Tdap/Td vaccine (1 - Tdap) Never done    Pap smear  Never done    COVID-19 Vaccine (1 - 2023-24 season) Never done    Flu vaccine (1) Never done

## 2024-08-26 NOTE — PROGRESS NOTES
S: 29 y.o. female with   Chief Complaint   Patient presents with    ED Follow-up     [facility] ED 08/12/2024 Follow-up Mild intermittent asthma with acute exacerbation     Concern for asthma. ED visit 8/17, 11/2023  About 4 years ago started noting wheezing as exercise.   Humidity/temperature changes. Prolonged bronchitis.  CTA chest normal.   Anxiety/depression   (Hers/hims -- online management)    BP Readings from Last 3 Encounters:   08/26/24 118/64   08/12/24 96/71   11/29/23 (!) 132/91     Wt Readings from Last 3 Encounters:   08/26/24 55.3 kg (122 lb)   08/12/24 55.3 kg (122 lb)   11/29/23 56.7 kg (125 lb)       O: VS:   Vitals:    08/26/24 1054   BP: 118/64   Site: Right Upper Arm   Position: Sitting   Cuff Size: Medium Adult   Pulse: 64   Resp: 12   Temp: 98 °F (36.7 °C)   TempSrc: Oral   SpO2: 98%   Weight: 55.3 kg (122 lb)   Height: 1.6 m (5' 2.99\")     Body mass index is 21.62 kg/m².    AAO/NAD, appropriate affect for mood  Normocephalic, atraumatic, eyes - conjunctiva and sclera normal,   skin no rashes on exposed areas   Insight, judgement normal and in no acute distress    Lab Results   Component Value Date    WBC 11.4 (H) 08/12/2024    HGB 14.3 08/12/2024    HCT 42.7 08/12/2024     08/12/2024    AST 20 05/21/2012     08/12/2024    K 4.5 08/12/2024     08/12/2024    CREATININE 0.8 08/12/2024    BUN 8 08/12/2024    CO2 24 08/12/2024    TSH 0.05 (L) 05/21/2012    LABGLOM > 90 08/12/2024    MG 2.1 05/21/2012    CALCIUM 8.6 08/12/2024       CTA CHEST W WO CONTRAST    Result Date: 8/12/2024  PROCEDURE: CTA CHEST W WO CONTRAST CLINICAL INFORMATION: Shortness of breath TECHNIQUE: CTA of the chest was performed following administration of 80 mL of Isovue-370 intravenous contrast. Axial images as well as coronal and sagittal MIP reconstructions were obtained. All CT scans at this facility use dose modulation, iterative reconstruction, and/or weight-based dosing when appropriate to reduce radiation

## 2024-08-31 ASSESSMENT — ENCOUNTER SYMPTOMS
CHEST TIGHTNESS: 0
SHORTNESS OF BREATH: 0
WHEEZING: 0